# Patient Record
Sex: MALE | Race: WHITE | NOT HISPANIC OR LATINO | Employment: OTHER | URBAN - METROPOLITAN AREA
[De-identification: names, ages, dates, MRNs, and addresses within clinical notes are randomized per-mention and may not be internally consistent; named-entity substitution may affect disease eponyms.]

---

## 2017-08-29 ENCOUNTER — ALLSCRIPTS OFFICE VISIT (OUTPATIENT)
Dept: OTHER | Facility: OTHER | Age: 82
End: 2017-08-29

## 2018-01-13 VITALS
HEIGHT: 67 IN | RESPIRATION RATE: 16 BRPM | WEIGHT: 194 LBS | HEART RATE: 64 BPM | DIASTOLIC BLOOD PRESSURE: 62 MMHG | OXYGEN SATURATION: 98 % | BODY MASS INDEX: 30.45 KG/M2 | TEMPERATURE: 96.6 F | SYSTOLIC BLOOD PRESSURE: 132 MMHG

## 2018-03-01 RX ORDER — AMLODIPINE BESYLATE AND BENAZEPRIL HYDROCHLORIDE 5; 20 MG/1; MG/1
1 CAPSULE ORAL DAILY
COMMUNITY

## 2018-03-01 RX ORDER — SIMVASTATIN 20 MG
1 TABLET ORAL DAILY
COMMUNITY

## 2018-03-06 ENCOUNTER — OFFICE VISIT (OUTPATIENT)
Dept: UROLOGY | Facility: MEDICAL CENTER | Age: 83
End: 2018-03-06
Payer: MEDICARE

## 2018-03-06 VITALS
WEIGHT: 196 LBS | DIASTOLIC BLOOD PRESSURE: 62 MMHG | SYSTOLIC BLOOD PRESSURE: 110 MMHG | HEIGHT: 68 IN | BODY MASS INDEX: 29.7 KG/M2

## 2018-03-06 DIAGNOSIS — N52.02 CORPORO-VENOUS OCCLUSIVE ERECTILE DYSFUNCTION: ICD-10-CM

## 2018-03-06 DIAGNOSIS — N13.8 BPH WITH OBSTRUCTION/LOWER URINARY TRACT SYMPTOMS: Primary | ICD-10-CM

## 2018-03-06 DIAGNOSIS — N40.1 BPH WITH OBSTRUCTION/LOWER URINARY TRACT SYMPTOMS: Primary | ICD-10-CM

## 2018-03-06 PROBLEM — H25.019 CORTICAL AGE-RELATED CATARACT: Status: ACTIVE | Noted: 2017-10-19

## 2018-03-06 LAB
SL AMB  POCT GLUCOSE, UA: 500
SL AMB LEUKOCYTE ESTERASE,UA: NORMAL
SL AMB POCT BILIRUBIN,UA: NORMAL
SL AMB POCT BLOOD,UA: NORMAL
SL AMB POCT CLARITY,UA: CLEAR
SL AMB POCT COLOR,UA: YELLOW
SL AMB POCT KETONES,UA: NORMAL
SL AMB POCT NITRITE,UA: NORMAL
SL AMB POCT PH,UA: 5.5
SL AMB POCT SPECIFIC GRAVITY,UA: 1.01
SL AMB POCT URINE PROTEIN: NORMAL
SL AMB POCT UROBILINOGEN: 0.2

## 2018-03-06 PROCEDURE — 81003 URINALYSIS AUTO W/O SCOPE: CPT | Performed by: UROLOGY

## 2018-03-06 PROCEDURE — 99214 OFFICE O/P EST MOD 30 MIN: CPT | Performed by: UROLOGY

## 2018-03-06 RX ORDER — ACEBUTOLOL HYDROCHLORIDE 400 MG/1
1000 CAPSULE ORAL AS NEEDED
Qty: 12 EACH | Refills: 5 | Status: SHIPPED | OUTPATIENT
Start: 2018-03-06 | End: 2018-09-24 | Stop reason: SDUPTHER

## 2018-03-06 RX ORDER — GLYBURIDE-METFORMIN HYDROCHLORIDE 5; 500 MG/1; MG/1
TABLET ORAL
COMMUNITY
Start: 2018-02-23

## 2018-03-06 RX ORDER — ACEBUTOLOL HYDROCHLORIDE 400 MG/1
CAPSULE ORAL
COMMUNITY
Start: 2018-01-12 | End: 2018-03-06 | Stop reason: SDUPTHER

## 2018-03-06 NOTE — ASSESSMENT & PLAN NOTE
PSA - 1 17 last year  Pros and cons of testing discussed-he wishes to continue psa testing  AUA ss= 3  He is pleased with his voiding pattern  We will continue to follow with watchful waiting

## 2018-03-06 NOTE — ASSESSMENT & PLAN NOTE
MUSE is working adequately, but patient notes Caverject worked better in the past   He will check and see if it is on formulary at [facility]

## 2018-03-06 NOTE — LETTER
March 6, 2018     Luis Keller MD  [address]  [address] [facility] [address] [facility] [address]    Patient: Becca Chavarria   YOB: 1935   Date of Visit: 3/6/2018       Dear Dr Paula Guillory: Thank you for referring Sujatha Barraza to me for evaluation  Below are my notes for this consultation  If you have questions, please do not hesitate to call me  I look forward to following your patient along with you  Sincerely,        Zulma Lambert MD        CC: No Recipients  Zulma Lambert MD  3/6/2018 11:33 AM  Sign at close encounter  Assessment/Plan:    BPH with obstruction/lower urinary tract symptoms  PSA - 1 17 last year  Pros and cons of testing discussed-he wishes to continue psa testing  AUA ss= 3  He is pleased with his voiding pattern  We will continue to follow with watchful waiting  Corporo-venous occlusive erectile dysfunction  MUSE is working adequately, but patient notes Caverject worked better in the past   He will check and see if it is on formulary at [facility]  Diagnoses and all orders for this visit:    BPH with obstruction/lower urinary tract symptoms  -     POCT urine dip auto non-scope  -     PSA Total, Diagnostic; Future    Corporo-venous occlusive erectile dysfunction  -     MUSE 1000 MCG pellet; 1 each (1,000 mcg total) by Transurethral route as needed for erectile dysfunction    Other orders  -     amLODIPine-benazepril (LOTREL) 5-20 MG per capsule; Take 1 capsule by mouth daily  -     metFORMIN (GLUCOPHAGE) 500 mg tablet; Take 1 tablet by mouth Twice daily  -     simvastatin (ZOCOR) 20 mg tablet; Take 1 tablet by mouth daily  -     Discontinue: MUSE 1000 MCG pellet;   -     glyBURIDE-metFORMIN (GLUCOVANCE) 5-500 MG per tablet;           Subjective:      Patient ID: Becca Chavarria is a 80 y o  male  51-year-old male followed for lower tract symptoms secondary to BPH and for erectile dysfunction  He notes he is voiding well    His stream is adequate and he feels he empties his bladder well  He gets up at most twice a night to urinate  He has no urgency or incontinence  He is pleased with his voiding pattern  There is no history of gross hematuria, dysuria or symptoms of infection  He has been using MUSE for rectal dysfunction and he notes that it works adequately  The following portions of the patient's history were reviewed and updated as appropriate: allergies, current medications, past family history, past medical history, past social history, past surgical history and problem list     Review of Systems   Constitutional: Negative for chills, diaphoresis, fatigue and fever  HENT: Negative  Eyes: Negative  Respiratory: Negative  Cardiovascular: Negative  Endocrine: Negative  Musculoskeletal: Negative  Skin: Negative  Allergic/Immunologic: Negative  Neurological: Negative  Hematological: Negative  Psychiatric/Behavioral: Negative  Objective:      /62 (BP Location: Left arm, Patient Position: Sitting)   Ht 5' 7 5" (1 715 m)   Wt 88 9 kg (196 lb)   BMI 30 24 kg/m²           Physical Exam   Constitutional: He is oriented to person, place, and time  He appears well-developed and well-nourished  HENT:   Head: Normocephalic and atraumatic  Eyes: Conjunctivae are normal    Neck: Neck supple  Cardiovascular: Normal rate  Pulmonary/Chest: Effort normal    Abdominal: Soft  Bowel sounds are normal  He exhibits no distension and no mass  There is no tenderness  There is no rebound, no guarding and no CVA tenderness  Hernia confirmed negative in the right inguinal area and confirmed negative in the left inguinal area  Genitourinary: Rectum normal, testes normal and penis normal  Prostate is enlarged  Prostate is not tender  Right testis shows no mass  Left testis shows no mass  No phimosis or hypospadias     Genitourinary Comments: PAT:  Normal tone, no mass, prostate moderately enlarged and palpably benign  No nodularity or tenderness  Musculoskeletal: He exhibits no edema  Neurological: He is alert and oriented to person, place, and time  Skin: Skin is warm and dry  Psychiatric: He has a normal mood and affect  His behavior is normal  Judgment and thought content normal    Nursing note and vitals reviewed

## 2018-03-06 NOTE — PROGRESS NOTES
Assessment/Plan:    BPH with obstruction/lower urinary tract symptoms  PSA - 1 17 last year  Pros and cons of testing discussed-he wishes to continue psa testing  AUA ss= 3  He is pleased with his voiding pattern  We will continue to follow with watchful waiting  Corporo-venous occlusive erectile dysfunction  MUSE is working adequately, but patient notes Caverject worked better in the past   He will check and see if it is on formulary at [address]  Diagnoses and all orders for this visit:    BPH with obstruction/lower urinary tract symptoms  -     POCT urine dip auto non-scope  -     PSA Total, Diagnostic; Future    Corporo-venous occlusive erectile dysfunction  -     MUSE 1000 MCG pellet; 1 each (1,000 mcg total) by Transurethral route as needed for erectile dysfunction    Other orders  -     amLODIPine-benazepril (LOTREL) 5-20 MG per capsule; Take 1 capsule by mouth daily  -     metFORMIN (GLUCOPHAGE) 500 mg tablet; Take 1 tablet by mouth Twice daily  -     simvastatin (ZOCOR) 20 mg tablet; Take 1 tablet by mouth daily  -     Discontinue: MUSE 1000 MCG pellet;   -     glyBURIDE-metFORMIN (GLUCOVANCE) 5-500 MG per tablet;           Subjective:      Patient ID: Marian Milan is a 80 y o  male  49-year-old male followed for lower tract symptoms secondary to BPH and for erectile dysfunction  He notes he is voiding well  His stream is adequate and he feels he empties his bladder well  He gets up at most twice a night to urinate  He has no urgency or incontinence  He is pleased with his voiding pattern  There is no history of gross hematuria, dysuria or symptoms of infection  He has been using MUSE for rectal dysfunction and he notes that it works adequately          The following portions of the patient's history were reviewed and updated as appropriate: allergies, current medications, past family history, past medical history, past social history, past surgical history and problem list     Review of Systems   Constitutional: Negative for chills, diaphoresis, fatigue and fever  HENT: Negative  Eyes: Negative  Respiratory: Negative  Cardiovascular: Negative  Endocrine: Negative  Musculoskeletal: Negative  Skin: Negative  Allergic/Immunologic: Negative  Neurological: Negative  Hematological: Negative  Psychiatric/Behavioral: Negative  Objective:      /62 (BP Location: Left arm, Patient Position: Sitting)   Ht 5' 7 5" (1 715 m)   Wt 88 9 kg (196 lb)   BMI 30 24 kg/m²          Physical Exam   Constitutional: He is oriented to person, place, and time  He appears well-developed and well-nourished  HENT:   Head: Normocephalic and atraumatic  Eyes: Conjunctivae are normal    Neck: Neck supple  Cardiovascular: Normal rate  Pulmonary/Chest: Effort normal    Abdominal: Soft  Bowel sounds are normal  He exhibits no distension and no mass  There is no tenderness  There is no rebound, no guarding and no CVA tenderness  Hernia confirmed negative in the right inguinal area and confirmed negative in the left inguinal area  Genitourinary: Rectum normal, testes normal and penis normal  Prostate is enlarged  Prostate is not tender  Right testis shows no mass  Left testis shows no mass  No phimosis or hypospadias  Genitourinary Comments: PAT:  Normal tone, no mass, prostate moderately enlarged and palpably benign  No nodularity or tenderness  Musculoskeletal: He exhibits no edema  Neurological: He is alert and oriented to person, place, and time  Skin: Skin is warm and dry  Psychiatric: He has a normal mood and affect  His behavior is normal  Judgment and thought content normal    Nursing note and vitals reviewed

## 2018-03-06 NOTE — PROGRESS NOTES
IPSS Questionnaire (AUA-7): Over the past month    1)  How often have you had a sensation of not emptying your bladder completely after you finish urinating? 0 - Not at all   2)  How often have you had to urinate again less than two hours after you finished urinating? 1 - Less than 1 time in 5   3)  How often have you found you stopped and started again several times when you urinated? 0 - Not at all   4) How difficult have you found it to postpone urination? 0 - Not at all   5) How often have you had a weak urinary stream?  0 - Not at all   6) How often have you had to push or strain to begin urination? 0 - Not at all   7) How many times did you most typically get up to urinate from the time you went to bed until the time you got up in the morning?   2 - 2 times   Total Score:  3

## 2018-03-06 NOTE — PATIENT INSTRUCTIONS
Benign Prostatic Hypertrophy   WHAT YOU NEED TO KNOW:   Benign prostatic hypertrophy (BPH) is a condition that causes your prostate gland to grow larger than normal  The prostate gland is the male sex gland that produces a fluid that is part of semen  It is about the size of a walnut and it is located under the bladder  As the prostate grows, it can squeeze the urethra  This can block urine flow and cause urinary problems  DISCHARGE INSTRUCTIONS:   Medicines:   · Alpha blockers: This medicine relaxes the muscles in your prostate and bladder  It may help you urinate more easily  · 5 alpha reductase inhibitors: These medicines block the production of a hormone that causes the prostate to get larger  It may help slow the growth of the prostate or shrink the prostate  · Take your medicine as directed  Contact your healthcare provider if you think your medicine is not helping or if you have side effects  Tell him or her if you are allergic to any medicine  Keep a list of the medicines, vitamins, and herbs you take  Include the amounts, and when and why you take them  Bring the list or the pill bottles to follow-up visits  Carry your medicine list with you in case of an emergency  Follow up with your healthcare provider as directed:  Write down your questions so you remember to ask them during your visits  Manage BPH:   · Do not let your bladder get too full before you empty it  Urinate when you feel the urge  · Limit alcohol  Do not drink large amounts of any liquid at one time  · Decrease the amount of salt you eat  Examples of salty foods are chips, cured meats, and canned soups  Do not use table salt  · Healthcare providers may tell you not to eat spicy foods such as chilli peppers  This may help you find out if spicy food makes your BPH symptoms worse  · You may have sex if you feel well  Contact your healthcare provider if:   · There is a large amount of blood in your urine  · Your signs and symptoms get worse  · You have a fever  · You have questions or concerns about your condition or care  Seek care immediately if:   · You are unable to urinate  · Your bladder feels very full and painful  © 2017 [address] [employer] Information is for End User's use only and may not be sold, redistributed or otherwise used for commercial purposes  All illustrations and images included in CareNotes® are the copyrighted property of A [employer] M [employer]  or Shay Menchaca  The above information is an  only  It is not intended as medical advice for individual conditions or treatments  Talk to your doctor, nurse or pharmacist before following any medical regimen to see if it is safe and effective for you

## 2018-09-24 DIAGNOSIS — N52.02 CORPORO-VENOUS OCCLUSIVE ERECTILE DYSFUNCTION: ICD-10-CM

## 2018-09-24 RX ORDER — ACEBUTOLOL HYDROCHLORIDE 400 MG/1
CAPSULE ORAL
Qty: 18 EACH | Refills: 3 | Status: SHIPPED | OUTPATIENT
Start: 2018-09-24 | End: 2019-03-12 | Stop reason: SDUPTHER

## 2019-03-12 ENCOUNTER — OFFICE VISIT (OUTPATIENT)
Dept: UROLOGY | Facility: MEDICAL CENTER | Age: 84
End: 2019-03-12
Payer: MEDICARE

## 2019-03-12 VITALS
SYSTOLIC BLOOD PRESSURE: 148 MMHG | WEIGHT: 189 LBS | HEART RATE: 66 BPM | BODY MASS INDEX: 30.37 KG/M2 | DIASTOLIC BLOOD PRESSURE: 80 MMHG | HEIGHT: 66 IN

## 2019-03-12 DIAGNOSIS — N40.1 BPH WITH OBSTRUCTION/LOWER URINARY TRACT SYMPTOMS: Primary | ICD-10-CM

## 2019-03-12 DIAGNOSIS — N13.8 BPH WITH OBSTRUCTION/LOWER URINARY TRACT SYMPTOMS: Primary | ICD-10-CM

## 2019-03-12 DIAGNOSIS — N52.02 CORPORO-VENOUS OCCLUSIVE ERECTILE DYSFUNCTION: ICD-10-CM

## 2019-03-12 LAB
SL AMB  POCT GLUCOSE, UA: ABNORMAL
SL AMB LEUKOCYTE ESTERASE,UA: ABNORMAL
SL AMB POCT BILIRUBIN,UA: ABNORMAL
SL AMB POCT BLOOD,UA: ABNORMAL
SL AMB POCT CLARITY,UA: CLEAR
SL AMB POCT COLOR,UA: YELLOW
SL AMB POCT KETONES,UA: ABNORMAL
SL AMB POCT NITRITE,UA: ABNORMAL
SL AMB POCT PH,UA: 5.5
SL AMB POCT SPECIFIC GRAVITY,UA: 1
SL AMB POCT URINE PROTEIN: ABNORMAL
SL AMB POCT UROBILINOGEN: 0.2

## 2019-03-12 PROCEDURE — 99214 OFFICE O/P EST MOD 30 MIN: CPT | Performed by: UROLOGY

## 2019-03-12 PROCEDURE — 81003 URINALYSIS AUTO W/O SCOPE: CPT | Performed by: UROLOGY

## 2019-03-12 RX ORDER — SILDENAFIL 100 MG/1
100 TABLET, FILM COATED ORAL DAILY PRN
Qty: 18 TABLET | Refills: 3 | Status: SHIPPED | OUTPATIENT
Start: 2019-03-12 | End: 2020-06-01 | Stop reason: ALTCHOICE

## 2019-03-12 NOTE — ASSESSMENT & PLAN NOTE
AUA symptom score is 10  He remains satisfied with his voiding pattern  Urinalysis is unremarkable  PSA in April 2018 was 2 83  We will continue to follow his voiding pattern with watchful waiting  We discussed the pros and cons of PSA testing and he wished to continue with PSA testing  He will return in 1 year

## 2019-03-12 NOTE — PATIENT INSTRUCTIONS
Sildenafil (By mouth)   Sildenafil ([id number])  Treats erectile dysfunction  Also treats pulmonary arterial hypertension (high blood pressure in the lungs)  Brand Name(s): Revatio Viagra   There may be other brand names for this medicine  When This Medicine Should Not Be Used: This medicine is not right for everyone  Do not use it if you had an allergic reaction to sildenafil  How to Use This Medicine:   Tablet, Liquid  · Your doctor will tell you how much medicine to use  Do not use more than directed  · For erectile dysfunction: Take this medicine about 1 hour before you have sex  Do not take it more than once a day  Always allow at least 24 hours between doses  · For pulmonary arterial hypertension:   ¨ Take this medicine 3 times a day, 4 to 6 hours apart  ¨ If you miss a dose, take it as soon as you remember  If it is almost time for your next dose, wait until then and take a regular dose  Do not take extra medicine to make up for a missed dose  · Oral liquid: Shake the bottle well for at least 10 seconds  Use the oral syringe provided in the package to measure each dose  Wash the syringe after each use  · Read and follow the patient instructions that come with this medicine  Talk to your doctor or pharmacist if you have any questions  · Store the medicine in a closed container at room temperature, away from heat, moisture, and direct light  · Throw away any unused mixed oral liquid after 60 days  Drugs and Foods to Avoid:   Ask your doctor or pharmacist before using any other medicine, including over-the-counter medicines, vitamins, and herbal products  · Do not use this medicine if you also use riociguat or a nitrate medicine  Do not take other medicines that contain sildenafil or similar medicines, such as tadalafil or vardenafil  · Some medicines can affect how sildenafil works   Tell your doctor if you are using any of the following:   ¨ Amlodipine, atazanavir, bosentan, cimetidine, erythromycin, indinavir, itraconazole, ketoconazole, rifampin, ritonavir, saquinavir  ¨ Medicine for prostate problems or high blood pressure (including alfuzosin, doxazosin, prazosin, silodosin, tamsulosin, terazosin)  Warnings While Using This Medicine:   · Tell your doctor if you are pregnant or breastfeeding, or if you have kidney disease, liver disease, pulmonary veno-occlusive disease, diabetes, bleeding problems, leukemia, multiple myeloma, sickle cell anemia, a stomach ulcer, or eye problems  Tell your doctor if you have angina or chest pain during sex, heart disease, heart rhythm problems, high or low blood pressure, or a history of heart attack or stroke  Also tell your doctor if you smoke  · Tell any doctor who treats you that you take sildenafil  · This medicine may cause the following problems:   ¨ Low blood pressure (especially if taken with other medicines that lower blood pressure)  ¨ Heart problems  ¨ Painful or prolonged erection  ¨ Vision or hearing problems  · Keep all medicine out of the reach of children  Never share your medicine with anyone    Possible Side Effects While Using This Medicine:   Call your doctor right away if you notice any of these side effects:  · Allergic reaction: Itching or hives, swelling in your face or hands, swelling or tingling in your mouth or throat, chest tightness, trouble breathing  · Chest pain, trouble breathing, sudden or severe headache  · Fast, slow, pounding, or uneven heartbeat  · Lightheadedness, fainting  · Painful erection or an erection that lasts longer than 4 hours  · Sudden loss of vision  · Sudden decrease in hearing or hearing loss, ringing in the ears, dizziness  If you notice these less serious side effects, talk with your doctor:   · Headache  · Nosebleeds  · Stuffy or runny nose  · Upset stomach  · Warmth or redness in your face, neck, arms, or upper chest  If you notice other side effects that you think are caused by this medicine, tell your doctor  Call your doctor for medical advice about side effects  You may report side effects to FDA at 0-241-SDA-7052  © 2017 [facility] Information is for End User's use only and may not be sold, redistributed or otherwise used for commercial purposes  The above information is an  only  It is not intended as medical advice for individual conditions or treatments  Talk to your doctor, nurse or pharmacist before following any medical regimen to see if it is safe and effective for you

## 2019-03-12 NOTE — LETTER
March 12, 2019     Dayne Boland MD  [address] [facility] [id number]    Patient: Yovana Nation   YOB: 1935   Date of Visit: 3/12/2019       Dear Dr Rupa Lainez: Thank you for referring Abby Quick to me for evaluation  Below are my notes for this consultation  If you have questions, please do not hesitate to call me  I look forward to following your patient along with you  Sincerely,        Stefania Dutta MD        CC: No Recipients  Stefania Dutta MD  3/12/2019 11:16 AM  Sign at close encounter  Assessment/Plan:    BPH with obstruction/lower urinary tract symptoms  AUA symptom score is 10  He remains satisfied with his voiding pattern  Urinalysis is unremarkable  PSA in April 2018 was 2 83  We will continue to follow his voiding pattern with watchful waiting  We discussed the pros and cons of PSA testing and he wished to continue with PSA testing  He will return in 1 year  Corporo-venous occlusive erectile dysfunction  The patient feels that in uses working adequately  He does wish to try Viagra again  Use and side effects were discussed  Prescriptions were E prescribed to his pharmacy  Diagnoses and all orders for this visit:    BPH with obstruction/lower urinary tract symptoms  -     POCT urine dip auto non-scope  -     PSA Total, Diagnostic; Future    Corporo-venous occlusive erectile dysfunction  -     alprostadil (MUSE) 1000 MCG pellet; 1 each (1,000 mcg total) by Transurethral route as needed for erectile dysfunction use no more than 3 times per week  -     sildenafil (VIAGRA) 100 mg tablet; Take 1 tablet (100 mg total) by mouth daily as needed for erectile dysfunction          Subjective:      Patient ID: Yovana Nation is a 80 y o  male  Benign Prostatic Hypertrophy   This is a chronic problem  The problem is unchanged  Irritative symptoms include nocturia  Irritative symptoms do not include frequency or urgency   Obstructive symptoms include a slower stream  Obstructive symptoms do not include dribbling, incomplete emptying, an intermittent stream, straining or a weak stream  Pertinent negatives include no chills, dysuria, genital pain, hematuria, hesitancy, nausea or vomiting  AUA score is 8-19  His sexual activity is non-contributory to the current illness  The symptoms are aggravated by caffeine  Past treatments include nothing  Erectile Dysfunction   This is a chronic problem  The current episode started more than 1 year ago  The problem is unchanged  The nature of his difficulty is achieving erection and maintaining erection  He reports no anxiety  Irritative symptoms include nocturia  Irritative symptoms do not include frequency or urgency  Obstructive symptoms include a slower stream  Obstructive symptoms do not include dribbling, incomplete emptying, an intermittent stream, straining or a weak stream  Pertinent negatives include no chills, dysuria, genital pain, hematuria or hesitancy  Nothing aggravates the symptoms  Treatments tried: MUSE  The treatment provided moderate relief  He has been using treatment for 2 or more years  He has had no adverse reactions caused by medications  The following portions of the patient's history were reviewed and updated as appropriate: allergies, current medications, past family history, past medical history, past social history, past surgical history and problem list     Review of Systems   Constitutional: Negative  Negative for chills, diaphoresis, fatigue and fever  HENT: Negative  Eyes: Negative  Respiratory: Negative  Cardiovascular: Negative  Gastrointestinal: Negative  Negative for nausea and vomiting  Endocrine: Negative  Genitourinary: Positive for nocturia  Negative for dysuria, frequency, hematuria, hesitancy, incomplete emptying and urgency  See HPI   Musculoskeletal: Negative  Skin: Negative  Allergic/Immunologic: Negative  Neurological: Negative  Hematological: Negative  Psychiatric/Behavioral: Negative  The patient is not nervous/anxious  Objective:      /80 (BP Location: Left arm, Patient Position: Sitting, Cuff Size: Adult)   Pulse 66   Ht 5' 6" (1 676 m)   Wt 85 7 kg (189 lb)   BMI 30 51 kg/m²           Physical Exam   Constitutional: He is oriented to person, place, and time  He appears well-developed and well-nourished  HENT:   Head: Normocephalic and atraumatic  Eyes: Conjunctivae are normal    Neck: Neck supple  Cardiovascular: Normal rate  Pulmonary/Chest: Effort normal    Abdominal: Soft  Bowel sounds are normal  He exhibits no distension and no mass  There is no tenderness  There is no rebound, no guarding and no CVA tenderness  Genitourinary: Rectum normal, testes normal and penis normal  Right testis shows no mass  Left testis shows no mass  No phimosis or hypospadias  Genitourinary Comments: Prostate 2 5 X enlarged  and palpably benign  The prostate is smooth, nontender  and free of induration  Musculoskeletal: He exhibits no edema  Neurological: He is alert and oriented to person, place, and time  Skin: Skin is warm and dry  Psychiatric: He has a normal mood and affect  His behavior is normal  Judgment and thought content normal    Vitals reviewed

## 2019-03-12 NOTE — PROGRESS NOTES
Assessment/Plan:    BPH with obstruction/lower urinary tract symptoms  AUA symptom score is 10  He remains satisfied with his voiding pattern  Urinalysis is unremarkable  PSA in April 2018 was 2 83  We will continue to follow his voiding pattern with watchful waiting  We discussed the pros and cons of PSA testing and he wished to continue with PSA testing  He will return in 1 year  Corporo-venous occlusive erectile dysfunction  The patient feels that in uses working adequately  He does wish to try Viagra again  Use and side effects were discussed  Prescriptions were E prescribed to his pharmacy  Diagnoses and all orders for this visit:    BPH with obstruction/lower urinary tract symptoms  -     POCT urine dip auto non-scope  -     PSA Total, Diagnostic; Future    Corporo-venous occlusive erectile dysfunction  -     alprostadil (MUSE) 1000 MCG pellet; 1 each (1,000 mcg total) by Transurethral route as needed for erectile dysfunction use no more than 3 times per week  -     sildenafil (VIAGRA) 100 mg tablet; Take 1 tablet (100 mg total) by mouth daily as needed for erectile dysfunction          Subjective:      Patient ID: Marian Milan is a 80 y o  male  Benign Prostatic Hypertrophy   This is a chronic problem  The problem is unchanged  Irritative symptoms include nocturia  Irritative symptoms do not include frequency or urgency  Obstructive symptoms include a slower stream  Obstructive symptoms do not include dribbling, incomplete emptying, an intermittent stream, straining or a weak stream  Pertinent negatives include no chills, dysuria, genital pain, hematuria, hesitancy, nausea or vomiting  AUA score is 8-19  His sexual activity is non-contributory to the current illness  The symptoms are aggravated by caffeine  Past treatments include nothing  Erectile Dysfunction   This is a chronic problem  The current episode started more than 1 year ago  The problem is unchanged   The nature of his difficulty is achieving erection and maintaining erection  He reports no anxiety  Irritative symptoms include nocturia  Irritative symptoms do not include frequency or urgency  Obstructive symptoms include a slower stream  Obstructive symptoms do not include dribbling, incomplete emptying, an intermittent stream, straining or a weak stream  Pertinent negatives include no chills, dysuria, genital pain, hematuria or hesitancy  Nothing aggravates the symptoms  Treatments tried: MUSE  The treatment provided moderate relief  He has been using treatment for 2 or more years  He has had no adverse reactions caused by medications  The following portions of the patient's history were reviewed and updated as appropriate: allergies, current medications, past family history, past medical history, past social history, past surgical history and problem list     Review of Systems   Constitutional: Negative  Negative for chills, diaphoresis, fatigue and fever  HENT: Negative  Eyes: Negative  Respiratory: Negative  Cardiovascular: Negative  Gastrointestinal: Negative  Negative for nausea and vomiting  Endocrine: Negative  Genitourinary: Positive for nocturia  Negative for dysuria, frequency, hematuria, hesitancy, incomplete emptying and urgency  See HPI   Musculoskeletal: Negative  Skin: Negative  Allergic/Immunologic: Negative  Neurological: Negative  Hematological: Negative  Psychiatric/Behavioral: Negative  The patient is not nervous/anxious  Objective:      /80 (BP Location: Left arm, Patient Position: Sitting, Cuff Size: Adult)   Pulse 66   Ht 5' 6" (1 676 m)   Wt 85 7 kg (189 lb)   BMI 30 51 kg/m²          Physical Exam   Constitutional: He is oriented to person, place, and time  He appears well-developed and well-nourished  HENT:   Head: Normocephalic and atraumatic  Eyes: Conjunctivae are normal    Neck: Neck supple  Cardiovascular: Normal rate  Pulmonary/Chest: Effort normal    Abdominal: Soft  Bowel sounds are normal  He exhibits no distension and no mass  There is no tenderness  There is no rebound, no guarding and no CVA tenderness  Genitourinary: Rectum normal, testes normal and penis normal  Right testis shows no mass  Left testis shows no mass  No phimosis or hypospadias  Genitourinary Comments: Prostate 2 5 X enlarged  and palpably benign  The prostate is smooth, nontender  and free of induration  Musculoskeletal: He exhibits no edema  Neurological: He is alert and oriented to person, place, and time  Skin: Skin is warm and dry  Psychiatric: He has a normal mood and affect  His behavior is normal  Judgment and thought content normal    Vitals reviewed

## 2019-03-12 NOTE — ASSESSMENT & PLAN NOTE
The patient feels that in uses working adequately  He does wish to try Viagra again  Use and side effects were discussed  Prescriptions were E prescribed to his pharmacy

## 2020-05-28 ENCOUNTER — TELEPHONE (OUTPATIENT)
Dept: UROLOGY | Facility: MEDICAL CENTER | Age: 85
End: 2020-05-28

## 2020-05-28 DIAGNOSIS — N52.02 CORPORO-VENOUS OCCLUSIVE ERECTILE DYSFUNCTION: ICD-10-CM

## 2020-06-01 RX ORDER — SILDENAFIL 100 MG/1
100 TABLET, FILM COATED ORAL DAILY PRN
Qty: 18 TABLET | Refills: 3 | Status: SHIPPED | OUTPATIENT
Start: 2020-06-01 | End: 2021-01-22 | Stop reason: ALTCHOICE

## 2021-01-22 ENCOUNTER — TELEPHONE (OUTPATIENT)
Dept: UROLOGY | Facility: MEDICAL CENTER | Age: 86
End: 2021-01-22

## 2021-01-22 ENCOUNTER — OFFICE VISIT (OUTPATIENT)
Dept: UROLOGY | Facility: MEDICAL CENTER | Age: 86
End: 2021-01-22
Payer: MEDICARE

## 2021-01-22 VITALS
DIASTOLIC BLOOD PRESSURE: 70 MMHG | WEIGHT: 187 LBS | BODY MASS INDEX: 29.35 KG/M2 | SYSTOLIC BLOOD PRESSURE: 150 MMHG | HEIGHT: 67 IN

## 2021-01-22 DIAGNOSIS — N13.8 BPH WITH OBSTRUCTION/LOWER URINARY TRACT SYMPTOMS: ICD-10-CM

## 2021-01-22 DIAGNOSIS — N40.1 BPH WITH OBSTRUCTION/LOWER URINARY TRACT SYMPTOMS: ICD-10-CM

## 2021-01-22 DIAGNOSIS — N52.02 CORPORO-VENOUS OCCLUSIVE ERECTILE DYSFUNCTION: Primary | ICD-10-CM

## 2021-01-22 PROBLEM — Z00.00 MEDICARE ANNUAL WELLNESS VISIT, SUBSEQUENT: Status: ACTIVE | Noted: 2017-07-20

## 2021-01-22 PROBLEM — R91.1 LUNG NODULE: Status: ACTIVE | Noted: 2018-07-19

## 2021-01-22 PROBLEM — S61.208A: Status: ACTIVE | Noted: 2020-12-10

## 2021-01-22 PROCEDURE — 99214 OFFICE O/P EST MOD 30 MIN: CPT | Performed by: UROLOGY

## 2021-01-22 NOTE — ASSESSMENT & PLAN NOTE
AUA symptom score is 1  He is pleased with his voiding pattern  Digital rectal examination is benign in nature  We will continue to follow his voiding pattern watchful waiting  We discussed the pros and cons of PSA testing and elected not to pursue this at age 80  He will return in 1 year

## 2021-01-22 NOTE — PROGRESS NOTES
Assessment/Plan:    BPH with obstruction/lower urinary tract symptoms  AUA symptom score is 1  He is pleased with his voiding pattern  Digital rectal examination is benign in nature  We will continue to follow his voiding pattern watchful waiting  We discussed the pros and cons of PSA testing and elected not to pursue this at age 80  He will return in 1 year  Combined arterial insufficiency and corporo-venous occlusive erectile dysfunction  He had been using Muse with satisfactory result but notes that intracorporeal injection therapy was more effective in the past   Options were discussed and he wished to try prostaglandin intracorporeal injection therapy  A prescription was given  He will return for formal injection therapy teaching  Diagnoses and all orders for this visit:    Corporo-venous occlusive erectile dysfunction  -     PROSTAGLANDIN PGE1 INJECTABLE 20 MCG/ML, STANDARD, IJ SOLN; 1 mL by Intracavernosal route daily as needed (ED)    BPH with obstruction/lower urinary tract symptoms          Subjective:      Patient ID: Maxx Rosenthal is a 80 y o  male  Benign Prostatic Hypertrophy  This is a chronic problem  The problem is unchanged  Irritative symptoms do not include frequency, nocturia (occasional) or urgency  Obstructive symptoms include a slower stream  Obstructive symptoms do not include dribbling, incomplete emptying, an intermittent stream, straining or a weak stream  Pertinent negatives include no chills, dysuria, genital pain, hematuria, hesitancy, nausea or vomiting  AUA score is 0-7  His sexual activity is non-contributory to the current illness  The symptoms are aggravated by caffeine  Past treatments include nothing  Erectile Dysfunction  This is a chronic problem  The current episode started more than 1 year ago  The problem is unchanged  The nature of his difficulty is achieving erection and maintaining erection  He reports no anxiety   Irritative symptoms do not include frequency, nocturia (occasional) or urgency  Obstructive symptoms include a slower stream  Obstructive symptoms do not include dribbling, incomplete emptying, an intermittent stream, straining or a weak stream  Pertinent negatives include no chills, dysuria, genital pain, hematuria or hesitancy  Nothing aggravates the symptoms  Treatments tried: MUSE  The treatment provided mild relief  He has been using treatment for 2 or more years  He has had no adverse reactions caused by medications  Risk factors include BPH, diabetes mellitus and chronic cardiac disease  The following portions of the patient's history were reviewed and updated as appropriate: allergies, current medications, past family history, past medical history, past social history, past surgical history and problem list     Review of Systems   Constitutional: Negative  Negative for chills, diaphoresis, fatigue and fever  HENT: Negative  Eyes: Negative  Respiratory: Negative  Cardiovascular: Negative  Gastrointestinal: Negative  Negative for nausea and vomiting  Endocrine: Negative  Genitourinary: Negative for dysuria, frequency, hematuria, hesitancy, incomplete emptying, nocturia (occasional) and urgency  See HPI   Musculoskeletal: Negative  Skin: Negative  Allergic/Immunologic: Negative  Neurological: Negative  Hematological: Negative  Psychiatric/Behavioral: Negative  The patient is not nervous/anxious  AUA SYMPTOM SCORE      Most Recent Value   AUA SYMPTOM SCORE   How often have you had a sensation of not emptying your bladder completely after you finished urinating? 1   How often have you had to urinate again less than two hours after you finished urinating? 0   How often have you found you stopped and started again several times when you urinate? 1   How often have you found it difficult to postpone urination?   0   How often have you had a weak urinary stream?  0   How often have you had to push or strain to begin urination? 0   How many times did you most typically get up to urinate from the time you went to bed at night until the time you got up in the morning? 1   Quality of Life: If you were to spend the rest of your life with your urinary condition just the way it is now, how would you feel about that?  1   AUA SYMPTOM SCORE  3        Objective:      /70 (BP Location: Left arm, Patient Position: Sitting, Cuff Size: Adult)   Ht 5' 7" (1 702 m)   Wt 84 8 kg (187 lb)   BMI 29 29 kg/m²          Physical Exam  Vitals signs reviewed  Constitutional:       General: He is not in acute distress  Appearance: Normal appearance  He is well-developed  He is not ill-appearing, toxic-appearing or diaphoretic  HENT:      Head: Normocephalic and atraumatic  Eyes:      General: No scleral icterus  Conjunctiva/sclera: Conjunctivae normal    Neck:      Musculoskeletal: Normal range of motion and neck supple  Cardiovascular:      Rate and Rhythm: Normal rate  Pulmonary:      Effort: Pulmonary effort is normal    Abdominal:      General: Bowel sounds are normal  There is no distension  Palpations: Abdomen is soft  There is no mass  Tenderness: There is no abdominal tenderness  There is no right CVA tenderness, left CVA tenderness, guarding or rebound  Hernia: No hernia is present  Genitourinary:     Penis: Normal  No phimosis or hypospadias  Scrotum/Testes: Normal          Right: Mass not present  Left: Mass not present  Rectum: Normal       Comments: Prostate 2 5 X enlarged  and palpably benign  The prostate is smooth, nontender  and free of induration  Skin:     General: Skin is warm and dry  Neurological:      General: No focal deficit present  Mental Status: He is alert and oriented to person, place, and time  Psychiatric:         Mood and Affect: Mood normal          Behavior: Behavior normal          Thought Content:  Thought content normal          Judgment: Judgment normal

## 2021-01-22 NOTE — PATIENT INSTRUCTIONS
Erectile Dysfunction   AMBULATORY CARE:   Erectile dysfunction (ED) , or impotence, is when you cannot get or keep an erection for sexual activity  Call your doctor if:   · You have chest pain, dizziness, or nausea after you take ED medicines or during or after sex  · You have an erection for more than 4 hours after you take your ED medicine  · You see blood in your urine  · You have changes in your vision, headaches, or back pain after you take ED medicine  · You have a painful erection  · You have questions or concerns about your condition or care  Treatment  depends on the cause of your ED  You may need any of the following:  · ED medicines  help you have an erection  These medicines are taken before you have sex  Follow instructions on how to use these medicines  You may have a life-threatening reaction if you mix ED medicines with medicines that contain nitrates  Medicines with nitrates include nitroglycerin and other heart medicines  · Testosterone  may be given to increase the levels in your blood and improve your ED  You may need to use a skin cream or wear a patch  Testosterone is also given as an injection  · Penis injections  may be done to help improve your blood flow  · A vacuum device  is a tube that is placed over the penis  A hand pump is connected to the tube and acts as a vacuum  This may help increase blood flow to the penis  · Therapy  may be needed to treat emotional or relationship problems that may be causing your ED  · Surgery  may be recommended if other treatments do not work  Surgery includes a penile implant or prosthesis  Surgery may also be done to improve blood flow  Ask for more information about surgeries for ED  Decrease your risk for ED:   · Do not smoke  Smoking can increase your risk for ED  Nicotine and other chemicals in cigarettes and cigars can also cause lung damage   Ask your healthcare provider for information if you currently smoke and need help to quit  E-cigarettes or smokeless tobacco still contain nicotine  · Control your blood sugar levels if you have diabetes  Over time, high blood sugar levels can increase your risk for ED  · Limit alcohol  Men should limit alcohol to 2 drinks a day  A drink of alcohol is 12 ounces of beer, 5 ounces of wine, or 1½ ounces of liquor  · Manage your medical conditions  Eat a variety of healthy foods and stay physically active  Take your medicines as directed  They can help control conditions that may cause ED  · Manage stress  Learn ways to relax, such as deep breathing, meditation, and listening to music  · Do not use illegal drugs  They increase your risk for ED  Follow up with your doctor as directed:  Write down your questions so you remember to ask them during your visits  © Copyright [address] Information is for End User's use only and may not be sold, redistributed or otherwise used for commercial purposes  All illustrations and images included in CareNotes® are the copyrighted property of A D A M , Inc  or [address] [place], [place] [address]   The above information is an  only  It is not intended as medical advice for individual conditions or treatments  Talk to your doctor, nurse or pharmacist before following any medical regimen to see if it is safe and effective for you

## 2021-01-22 NOTE — ASSESSMENT & PLAN NOTE
He had been using Muse with satisfactory result but notes that intracorporeal injection therapy was more effective in the past   Options were discussed and he wished to try prostaglandin intracorporeal injection therapy  A prescription was given  He will return for formal injection therapy teaching

## 2021-01-22 NOTE — TELEPHONE ENCOUNTER
Pt questioning when he should get his prescription filled  He is scheduled with [facility] for injection teaching 4/9/21  Per Dr Ele Ponce, patient should wait until a few weeks before appt to get prescription filled        Left message on voice mail for patient regarding instructions

## 2021-04-09 ENCOUNTER — OFFICE VISIT (OUTPATIENT)
Dept: UROLOGY | Facility: MEDICAL CENTER | Age: 86
End: 2021-04-09
Payer: MEDICARE

## 2021-04-09 VITALS
DIASTOLIC BLOOD PRESSURE: 84 MMHG | WEIGHT: 187 LBS | SYSTOLIC BLOOD PRESSURE: 122 MMHG | HEART RATE: 88 BPM | BODY MASS INDEX: 29.35 KG/M2 | HEIGHT: 67 IN

## 2021-04-09 DIAGNOSIS — N52.9 ERECTILE DYSFUNCTION, UNSPECIFIED ERECTILE DYSFUNCTION TYPE: Primary | ICD-10-CM

## 2021-04-09 PROCEDURE — 99214 OFFICE O/P EST MOD 30 MIN: CPT | Performed by: NURSE PRACTITIONER

## 2021-04-09 RX ORDER — SIMVASTATIN 40 MG
TABLET ORAL
COMMUNITY
Start: 2021-01-26

## 2021-04-09 NOTE — PROGRESS NOTES
Trimix teaching  4/9/2021      Assessment and Plan    80 y o  male managed by Dr Vicki Ngo    1  Erectile Dysfunction    Patient was provided verbal and physical demonstration of Trimix use  He was instructed on storage, disposal, and titration of the medication  He was educated on hospital precautions for priapism  He was able to demonstrate understanding of injection and injected 0 1cc of the medication today in the office  He tolerated the procedure well  Depending on response, will titrate as necessary and follow up in 1 year  All questions and concerns have been addressed and answered  Chief Complaint   Patient presents with    Erectile Dysfunction         History of Present Illness  Elvira Castillo is a 80 y o  male here for trimix injection teaching  He has a history of erectile dysfuntion and has failed PDE5 inhibitors including MUSE  Patient with a longstanding history of erectile dysfunction and has had successful use with [place]  At his last office evaluation felt it would be more satisfactory to use intracorporeal injections  He presents to the office today for [facility] [employer] teaching  Past medical history includes BPH without lower urinary tract symptoms  He denies changes to his general health and urinary symptoms since his prior office evaluation in the office  Review of Systems   Constitutional: Negative for chills and fever  Respiratory: Negative for cough and shortness of breath  Cardiovascular: Negative for chest pain  Gastrointestinal: Negative for abdominal distention, abdominal pain, blood in stool, nausea and vomiting  Genitourinary: Negative for difficulty urinating, dysuria, enuresis, flank pain, frequency, hematuria and urgency  Skin: Negative for rash           Past Medical History  Past Medical History:   Diagnosis Date    BPH with obstruction/lower urinary tract symptoms     Combined arterial insufficiency and corporo-venous occlusive erectile dysfunction        Past Social History  History reviewed  No pertinent surgical history  Past Family History  Family History   Problem Relation Age of Onset    Hypertension Family        Past Social history  Social History     Socioeconomic History    Marital status:       Spouse name: Not on file    Number of children: Not on file    Years of education: Not on file    Highest education level: Not on file   Occupational History    Not on file   Social Needs    Financial resource strain: Not on file    Food insecurity     Worry: Not on file     Inability: Not on file    Transportation needs     Medical: Not on file     Non-medical: Not on file   Tobacco Use    Smoking status: Former Smoker    Smokeless tobacco: Never Used   Substance and Sexual Activity    Alcohol use: No    Drug use: No    Sexual activity: Not on file   Lifestyle    Physical activity     Days per week: Not on file     Minutes per session: Not on file    Stress: Not on file   Relationships    Social connections     Talks on phone: Not on file     Gets together: Not on file     Attends Alevism service: Not on file     Active member of club or organization: Not on file     Attends meetings of clubs or organizations: Not on file     Relationship status: Not on file    Intimate partner violence     Fear of current or ex partner: Not on file     Emotionally abused: Not on file     Physically abused: Not on file     Forced sexual activity: Not on file   Other Topics Concern    Not on file   Social History Narrative    Not on file       Current Medications  Current Outpatient Medications   Medication Sig Dispense Refill    amLODIPine-benazepril (LOTREL) 5-20 MG per capsule Take 1 capsule by mouth daily      glyBURIDE-metFORMIN (GLUCOVANCE) 5-500 MG per tablet       PROSTAGLANDIN PGE1 INJECTABLE 20 MCG/ML, STANDARD, IJ SOLN 1 mL by Intracavernosal route daily as needed (ED) 5 mL 3    simvastatin (ZOCOR) 40 mg tablet       metFORMIN (GLUCOPHAGE) 500 mg tablet Take 1 tablet by mouth Twice daily      simvastatin (ZOCOR) 20 mg tablet Take 1 tablet by mouth daily       No current facility-administered medications for this visit  Allergies  No Known Allergies      Past Medical History, Social History, Family History, medications and allergies were reviewed  Vitals  Vitals:    04/09/21 1351   BP: 122/84   Pulse: 88   Weight: 84 8 kg (187 lb)   Height: 5' 7" (1 702 m)         Physical Exam    Constitutional   General appearance: Patient is seated and in no acute distress, well appearing and well nourished  Head and Face   Head and face: Normal     Eyes   Conjunctiva and lids: No erythema, swelling or discharge  Ears, Nose, Mouth, and Throat   Hearing: Normal     Pulmonary   Respiratory effort: No increased work of breathing or signs of respiratory distress  Cardiovascular   Examination of extremities for edema and/or varicosities: Normal     Abdomen   Abdomen: Non-tender, no masses  Genitourinary  Penis uncircumcised, phallus normal, meatus patent  Testicles descended into scrotum bilaterally without masses nor tenderness  Musculoskeletal   Gait and station:     Skin   Skin and subcutaneous tissue: Warm, dry, and intact  No visible lesions or rashes  Psychiatric   Judgment and insight: Normal  Recent and remote memory:  Normal  Mood and affect: Normal        Procedure: intracavernosal injection  Indication: Erectile Rehabilitation  secondary to diabetes  Discussed:  Proper technique and instruction for intracavernosal injection were explained to the patient  risks of injection including but not limited to pain, bleeding, infection, fibrosis, and priapism (including the potential complications of priapism) were discussed  Procedure: The skin overlying the injection site was then prepped with Alcohol  0 1 ml of was injected into the corpora cavernosum  Patient Status:  the patient was closely monitored for 10 minutes and reassessed  He tolerated the procedure well  Response to intracavernosal injection was good   Complications:  No complications noted  Instructions:  the patient was counseled about priapism and instructed to return to the clinic or the emergency room if his erection lasted up to 4 hours  the patient expressed understanding of the injection technique and felt able to perform self-injection         RENEE Grossman

## 2022-08-04 ENCOUNTER — TELEPHONE (OUTPATIENT)
Dept: OTHER | Facility: OTHER | Age: 87
End: 2022-08-04

## 2022-08-04 NOTE — TELEPHONE ENCOUNTER
Patient is requesting refill on Prostaglandin medication  Patient has not  been seen by Dr Isaac Zavala since 1/22/2021

## 2022-08-05 NOTE — TELEPHONE ENCOUNTER
Call placed to patient  He did not answer  LMOM informing patient he needs to contact the office to schedule an office appointment for evaluation at this time before medication can be refilled  Office number was provided for the patient to call back and schedule

## 2022-09-08 ENCOUNTER — TELEPHONE (OUTPATIENT)
Dept: OTHER | Facility: OTHER | Age: 87
End: 2022-09-08

## 2022-09-09 ENCOUNTER — OFFICE VISIT (OUTPATIENT)
Dept: UROLOGY | Facility: MEDICAL CENTER | Age: 87
End: 2022-09-09
Payer: MEDICARE

## 2022-09-09 VITALS
DIASTOLIC BLOOD PRESSURE: 72 MMHG | SYSTOLIC BLOOD PRESSURE: 162 MMHG | BODY MASS INDEX: 29.03 KG/M2 | HEART RATE: 67 BPM | HEIGHT: 67 IN | OXYGEN SATURATION: 97 % | WEIGHT: 185 LBS

## 2022-09-09 DIAGNOSIS — N52.02 CORPORO-VENOUS OCCLUSIVE ERECTILE DYSFUNCTION: ICD-10-CM

## 2022-09-09 DIAGNOSIS — E11.9 TYPE 2 DIABETES MELLITUS WITHOUT COMPLICATION, UNSPECIFIED WHETHER LONG TERM INSULIN USE (HCC): ICD-10-CM

## 2022-09-09 DIAGNOSIS — N13.8 BPH WITH OBSTRUCTION/LOWER URINARY TRACT SYMPTOMS: Primary | ICD-10-CM

## 2022-09-09 DIAGNOSIS — N40.1 BPH WITH OBSTRUCTION/LOWER URINARY TRACT SYMPTOMS: Primary | ICD-10-CM

## 2022-09-09 PROCEDURE — 99214 OFFICE O/P EST MOD 30 MIN: CPT | Performed by: UROLOGY

## 2022-09-09 NOTE — ASSESSMENT & PLAN NOTE
AUA symptom score is 5  He is pleased with his voiding pattern  Digital rectal examination is benign in nature  We will continue to follow his voiding pattern watchful waiting  He will return in 1 year

## 2022-09-09 NOTE — ASSESSMENT & PLAN NOTE
The patient is tired of using intracorporeal injection therapy  He would like to try [place] again  A prescription was sent to his pharmacy  He will return in 1 year

## 2022-09-09 NOTE — PROGRESS NOTES
Assessment/Plan:    BPH with obstruction/lower urinary tract symptoms  AUA symptom score is 5  He is pleased with his voiding pattern  Digital rectal examination is benign in nature  We will continue to follow his voiding pattern watchful waiting  He will return in 1 year  Combined arterial insufficiency and corporo-venous occlusive erectile dysfunction  The patient is tired of using intracorporeal injection therapy  He would like to try [place] again  A prescription was sent to his pharmacy  He will return in 1 year  Diagnoses and all orders for this visit:    BPH with obstruction/lower urinary tract symptoms    Corporo-venous occlusive erectile dysfunction  -     alprostadil (MUSE) 1000 MCG pellet; 1 each (1,000 mcg total) by Transurethral route as needed for erectile dysfunction use no more than 3 times per week  3 boxes of 6 pllt    Type 2 diabetes mellitus without complication, unspecified whether long term insulin use (HCC)          Subjective:      Patient ID: Blade Soto is a 80 y o  male  Benign Prostatic Hypertrophy  This is a chronic problem  The current episode started more than 1 year ago  The problem is unchanged  Irritative symptoms do not include frequency, nocturia or urgency  Obstructive symptoms include a slower stream  Obstructive symptoms do not include dribbling, incomplete emptying, an intermittent stream, straining or a weak stream  Pertinent negatives include no chills, dysuria or hematuria  AUA score is 0-7  His sexual activity is non-contributory to the current illness  Nothing aggravates the symptoms  Past treatments include nothing  Erectile Dysfunction  This is a chronic problem  The current episode started more than 1 year ago  The problem is unchanged  The nature of his difficulty is achieving erection  He reports no decreased libido  Irritative symptoms do not include frequency, nocturia or urgency   Obstructive symptoms include a slower stream  Obstructive symptoms do not include dribbling, incomplete emptying, an intermittent stream, straining or a weak stream  Pertinent negatives include no chills, dysuria or hematuria  Nothing aggravates the symptoms  Past treatments include injection treatment  He has had no adverse reactions caused by medications  He does not want to do injection therapy any longer  Oral meds have not been effective  He has used Muse before and it worked - he  would like to try MUSE again  The following portions of the patient's history were reviewed and updated as appropriate: allergies, current medications, past family history, past medical history, past social history, past surgical history and problem list     Review of Systems   Constitutional: Negative for chills, diaphoresis, fatigue and fever  HENT: Positive for hearing loss  Eyes: Negative  Respiratory: Negative  Cardiovascular: Negative  Gastrointestinal: Negative  Endocrine: Negative  Genitourinary: Negative for decreased libido, dysuria, frequency, hematuria, incomplete emptying, nocturia and urgency  See HPI   Musculoskeletal: Negative  Skin: Negative  Allergic/Immunologic: Negative  Neurological: Negative  Hematological: Negative  Psychiatric/Behavioral: Negative  AUA SYMPTOM SCORE    Flowsheet Row Most Recent Value   AUA SYMPTOM SCORE    How often have you had a sensation of not emptying your bladder completely after you finished urinating? 1   How often have you had to urinate again less than two hours after you finished urinating? 2   How often have you found you stopped and started again several times when you urinate? 0   How often have you found it difficult to postpone urination? 0   How often have you had a weak urinary stream? 0   How often have you had to push or strain to begin urination?  0   How many times did you most typically get up to urinate from the time you went to bed at night until the time you got up in the morning? 2   Quality of Life: If you were to spend the rest of your life with your urinary condition just the way it is now, how would you feel about that? 1   AUA SYMPTOM SCORE 5      Objective:      /72   Pulse 67   Ht 5' 7" (1 702 m)   Wt 83 9 kg (185 lb)   SpO2 97%   BMI 28 98 kg/m²          Physical Exam  Vitals reviewed  Constitutional:       General: He is not in acute distress  Appearance: Normal appearance  He is well-developed  He is not ill-appearing, toxic-appearing or diaphoretic  HENT:      Head: Normocephalic and atraumatic  Eyes:      General: No scleral icterus  Conjunctiva/sclera: Conjunctivae normal    Cardiovascular:      Rate and Rhythm: Normal rate  Pulmonary:      Effort: Pulmonary effort is normal    Abdominal:      General: Bowel sounds are normal  There is no distension  Palpations: Abdomen is soft  There is no mass  Tenderness: There is no abdominal tenderness  There is no right CVA tenderness, left CVA tenderness, guarding or rebound  Hernia: No hernia is present  Genitourinary:     Penis: Normal  No phimosis or hypospadias  Testes: Normal          Right: Mass not present  Left: Mass not present  Rectum: Normal       Comments: Prostate moderately enlarged and palpably benign  Musculoskeletal:         General: Normal range of motion  Cervical back: Neck supple  Skin:     General: Skin is warm and dry  Neurological:      Mental Status: He is alert and oriented to person, place, and time  Psychiatric:         Mood and Affect: Mood normal          Behavior: Behavior normal          Thought Content:  Thought content normal          Judgment: Judgment normal

## 2022-10-12 PROBLEM — Z00.00 MEDICARE ANNUAL WELLNESS VISIT, SUBSEQUENT: Status: RESOLVED | Noted: 2017-07-20 | Resolved: 2022-10-12

## 2022-10-21 ENCOUNTER — TELEPHONE (OUTPATIENT)
Dept: UROLOGY | Facility: AMBULATORY SURGERY CENTER | Age: 87
End: 2022-10-21

## 2022-10-21 DIAGNOSIS — N52.02 CORPORO-VENOUS OCCLUSIVE ERECTILE DYSFUNCTION: ICD-10-CM

## 2022-10-21 NOTE — TELEPHONE ENCOUNTER
Patient left voicemail requesting a call back  Attempted to contact patient,  No answer   Left voicemail to call office back

## 2022-10-21 NOTE — TELEPHONE ENCOUNTER
Patient needs to have a new prescription written stated [facility] is out     Medication Refill Request     Name   alprostadil (MUSE)  Dose/Frequency 1000 MCG pellet    Quantity 18 each   Verified pharmacy   [x]  Verified ordering Provider   [x]  Does patient have enough for the next 3 days?  Yes [] No [x]      [id number] - [id number], [address]   Phone:  [phone]

## 2022-10-25 NOTE — TELEPHONE ENCOUNTER
Patient calling to confirm if prescription request for [place] was sent to Dr Hernandes Mosotho  Informed patient the refill request was received  Patient requesting a call back at [phone] once the prescription is filled

## 2022-10-27 ENCOUNTER — TELEPHONE (OUTPATIENT)
Dept: UROLOGY | Facility: MEDICAL CENTER | Age: 87
End: 2022-10-27

## 2022-10-27 ENCOUNTER — TELEPHONE (OUTPATIENT)
Dept: UROLOGY | Facility: AMBULATORY SURGERY CENTER | Age: 87
End: 2022-10-27

## 2022-10-27 DIAGNOSIS — N52.02 CORPORO-VENOUS OCCLUSIVE ERECTILE DYSFUNCTION: ICD-10-CM

## 2022-10-27 NOTE — TELEPHONE ENCOUNTER
Patient requesting refill for [place] sent to [facility] in [facility]

## 2022-11-29 DIAGNOSIS — N52.02 CORPORO-VENOUS OCCLUSIVE ERECTILE DYSFUNCTION: ICD-10-CM

## 2022-11-29 NOTE — TELEPHONE ENCOUNTER
Per patient, he has been out of this medication for 2 months  medication was sent to wrong pharmacy 2 times  Medication Refill Request     Name Muse  Dose/Frequency 1000 MCG 1 each (1,000 mcg total) by Transurethral route as needed for erectile dysfunction use no more than 3 times per week  Quantity 18  Verified pharmacy   [x]  Verified ordering Provider   [x]  Does patient have enough for the next 3 days?  Yes [] No

## 2023-10-20 ENCOUNTER — HOSPITAL ENCOUNTER (INPATIENT)
Facility: HOSPITAL | Age: 88
LOS: 1 days | Discharge: HOME HEALTH | DRG: 084 | End: 2023-10-21
Attending: SURGERY | Admitting: SURGERY
Payer: MEDICARE

## 2023-10-20 ENCOUNTER — APPOINTMENT (EMERGENCY)
Dept: CT IMAGING | Facility: HOSPITAL | Age: 88
DRG: 084 | End: 2023-10-20
Payer: MEDICARE

## 2023-10-20 ENCOUNTER — HOSPITAL ENCOUNTER (EMERGENCY)
Facility: HOSPITAL | Age: 88
DRG: 084 | End: 2023-10-20
Attending: EMERGENCY MEDICINE
Payer: MEDICARE

## 2023-10-20 VITALS
DIASTOLIC BLOOD PRESSURE: 69 MMHG | RESPIRATION RATE: 22 BRPM | TEMPERATURE: 98.5 F | HEIGHT: 67 IN | SYSTOLIC BLOOD PRESSURE: 141 MMHG | HEART RATE: 75 BPM | OXYGEN SATURATION: 94 % | BODY MASS INDEX: 29.03 KG/M2 | WEIGHT: 185 LBS

## 2023-10-20 DIAGNOSIS — I10 HYPERTENSION: ICD-10-CM

## 2023-10-20 DIAGNOSIS — S06.4XAA EPIDURAL HEMATOMA (HCC): Primary | ICD-10-CM
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
## 2023-10-20 DIAGNOSIS — S06.4X0A INTRACRANIAL EPIDURAL HEMATOMA (HCC): Primary | ICD-10-CM

## 2023-10-20 DIAGNOSIS — S00.01XA ABRASION OF SCALP, INITIAL ENCOUNTER: ICD-10-CM

## 2023-10-20 LAB
ALBUMIN SERPL BCP-MCNC: 4.6 G/DL (ref 3.5–5)
ALP SERPL-CCNC: 65 U/L (ref 34–104)
ALT SERPL W P-5'-P-CCNC: 28 U/L (ref 7–52)
ANION GAP SERPL CALCULATED.3IONS-SCNC: 9 MMOL/L
APTT PPP: 30 SECONDS (ref 23–37)
AST SERPL W P-5'-P-CCNC: 19 U/L (ref 13–39)
BASOPHILS # BLD MANUAL: 0 THOUSAND/UL (ref 0–0.1)
BASOPHILS NFR MAR MANUAL: 0 % (ref 0–1)
BILIRUB SERPL-MCNC: 0.67 MG/DL (ref 0.2–1)
BUN SERPL-MCNC: 25 MG/DL (ref 5–25)
CALCIUM SERPL-MCNC: 9.9 MG/DL (ref 8.4–10.2)
CHLORIDE SERPL-SCNC: 100 MMOL/L (ref 96–108)
CO2 SERPL-SCNC: 27 MMOL/L (ref 21–32)
CREAT SERPL-MCNC: 0.87 MG/DL (ref 0.6–1.3)
EOSINOPHIL # BLD MANUAL: 0 THOUSAND/UL (ref 0–0.4)
EOSINOPHIL NFR BLD MANUAL: 0 % (ref 0–6)
ERYTHROCYTE [DISTWIDTH] IN BLOOD BY AUTOMATED COUNT: 12.8 % (ref 11.6–15.1)
GFR SERPL CREATININE-BSD FRML MDRD: 77 ML/MIN/1.73SQ M
GLUCOSE SERPL-MCNC: 187 MG/DL (ref 65–140)
HCT VFR BLD AUTO: 38.8 % (ref 36.5–49.3)
HGB BLD-MCNC: 13.3 G/DL (ref 12–17)
INR PPP: 0.97 (ref 0.84–1.19)
LYMPHOCYTES # BLD AUTO: 1.09 THOUSAND/UL (ref 0.6–4.47)
LYMPHOCYTES # BLD AUTO: 7 % (ref 14–44)
MCH RBC QN AUTO: 30.4 PG (ref 26.8–34.3)
MCHC RBC AUTO-ENTMCNC: 34.3 G/DL (ref 31.4–37.4)
MCV RBC AUTO: 89 FL (ref 82–98)
MONOCYTES # BLD AUTO: 0.94 THOUSAND/UL (ref 0–1.22)
MONOCYTES NFR BLD: 6 % (ref 4–12)
NEUTROPHILS # BLD MANUAL: 13.61 THOUSAND/UL (ref 1.85–7.62)
NEUTS BAND NFR BLD MANUAL: 1 % (ref 0–8)
NEUTS SEG NFR BLD AUTO: 86 % (ref 43–75)
PLATELET # BLD AUTO: 273 THOUSANDS/UL (ref 149–390)
PLATELET BLD QL SMEAR: ADEQUATE
PMV BLD AUTO: 9.6 FL (ref 8.9–12.7)
POTASSIUM SERPL-SCNC: 4.1 MMOL/L (ref 3.5–5.3)
PROT SERPL-MCNC: 7.3 G/DL (ref 6.4–8.4)
PROTHROMBIN TIME: 12.9 SECONDS (ref 11.6–14.5)
RBC # BLD AUTO: 4.38 MILLION/UL (ref 3.88–5.62)
RBC MORPH BLD: NORMAL
SODIUM SERPL-SCNC: 136 MMOL/L (ref 135–147)
WBC # BLD AUTO: 15.64 THOUSAND/UL (ref 4.31–10.16)

## 2023-10-20 PROCEDURE — 1124F ACP DISCUSS-NO DSCNMKR DOCD: CPT | Performed by: SURGERY

## 2023-10-20 PROCEDURE — 99291 CRITICAL CARE FIRST HOUR: CPT | Performed by: EMERGENCY MEDICINE

## 2023-10-20 PROCEDURE — 80053 COMPREHEN METABOLIC PANEL: CPT | Performed by: EMERGENCY MEDICINE

## 2023-10-20 PROCEDURE — 36415 COLL VENOUS BLD VENIPUNCTURE: CPT | Performed by: EMERGENCY MEDICINE

## 2023-10-20 PROCEDURE — 85610 PROTHROMBIN TIME: CPT | Performed by: EMERGENCY MEDICINE

## 2023-10-20 PROCEDURE — 99223 1ST HOSP IP/OBS HIGH 75: CPT | Performed by: SURGERY

## 2023-10-20 PROCEDURE — 99284 EMERGENCY DEPT VISIT MOD MDM: CPT

## 2023-10-20 PROCEDURE — G1004 CDSM NDSC: HCPCS

## 2023-10-20 PROCEDURE — 85007 BL SMEAR W/DIFF WBC COUNT: CPT | Performed by: EMERGENCY MEDICINE

## 2023-10-20 PROCEDURE — 72125 CT NECK SPINE W/O DYE: CPT

## 2023-10-20 PROCEDURE — 70450 CT HEAD/BRAIN W/O DYE: CPT

## 2023-10-20 PROCEDURE — 85027 COMPLETE CBC AUTOMATED: CPT | Performed by: EMERGENCY MEDICINE

## 2023-10-20 PROCEDURE — 85730 THROMBOPLASTIN TIME PARTIAL: CPT | Performed by: EMERGENCY MEDICINE

## 2023-10-20 RX ORDER — INSULIN LISPRO 100 [IU]/ML
1-6 INJECTION, SOLUTION INTRAVENOUS; SUBCUTANEOUS
Status: DISCONTINUED | OUTPATIENT
Start: 2023-10-21 | End: 2023-10-21 | Stop reason: HOSPADM

## 2023-10-20 RX ORDER — ACETAMINOPHEN 325 MG/1
650 TABLET ORAL EVERY 6 HOURS PRN
Status: DISCONTINUED | OUTPATIENT
Start: 2023-10-20 | End: 2023-10-21 | Stop reason: HOSPADM

## 2023-10-20 RX ORDER — FENTANYL CITRATE 50 UG/ML
50 INJECTION, SOLUTION INTRAMUSCULAR; INTRAVENOUS ONCE
Status: DISCONTINUED | OUTPATIENT
Start: 2023-10-20 | End: 2023-10-20 | Stop reason: HOSPADM

## 2023-10-20 RX ORDER — PRAVASTATIN SODIUM 80 MG/1
80 TABLET ORAL
Status: DISCONTINUED | OUTPATIENT
Start: 2023-10-21 | End: 2023-10-21 | Stop reason: HOSPADM

## 2023-10-20 RX ORDER — LEVETIRACETAM 500 MG/1
500 TABLET ORAL 2 TIMES DAILY
Status: DISCONTINUED | OUTPATIENT
Start: 2023-10-20 | End: 2023-10-21 | Stop reason: HOSPADM

## 2023-10-20 RX ORDER — AMLODIPINE BESYLATE 5 MG/1
5 TABLET ORAL DAILY
Status: DISCONTINUED | OUTPATIENT
Start: 2023-10-21 | End: 2023-10-21 | Stop reason: HOSPADM

## 2023-10-20 RX ORDER — ONDANSETRON 2 MG/ML
4 INJECTION INTRAMUSCULAR; INTRAVENOUS EVERY 6 HOURS PRN
Status: DISCONTINUED | OUTPATIENT
Start: 2023-10-20 | End: 2023-10-21 | Stop reason: HOSPADM

## 2023-10-20 RX ORDER — CHLORTHALIDONE 25 MG/1
25 TABLET ORAL DAILY
COMMUNITY
Start: 2023-05-05 | End: 2024-05-04

## 2023-10-20 NOTE — ED NOTES
Patient ambulated to the bathroom without difficulty     [facility]  10/20/23 7302

## 2023-10-20 NOTE — EMTALA/ACUTE CARE TRANSFER
[address] [place], [place] [id number]  Dept: [phone]      EMTALA TRANSFER CONSENT    NAME Joesph RAIN 1935                              MRN [id number]    I have been informed of my rights regarding examination, treatment, and transfer   by Dr. Analy Srinivasan MD    Benefits: Specialized equipment and/or services available at the receiving facility (Include comment)________________________ (trauma)    Risks: Potential for delay in receiving treatment, Potential deterioration of medical condition, Loss of IV, Increased discomfort during transfer, Possible worsening of condition or death during transfer      Consent for Transfer:  I acknowledge that my medical condition has been evaluated and explained to me by the emergency department physician or other qualified medical person and/or my attending physician, who has recommended that I be transferred to the service of  Accepting Physician: Kelly Calixto at [facility] [address] Name, [address] [place] [address] Street : [address] [place] [address]. The above potential benefits of such transfer, the potential risks associated with such transfer, and the probable risks of not being transferred have been explained to me, and I fully understand them. The doctor has explained that, in my case, the benefits of transfer outweigh the risks. I agree to be transferred. I authorize the performance of emergency medical procedures and treatments upon me in both transit and upon arrival at the receiving facility. Additionally, I authorize the release of any and all medical records to the receiving facility and request they be transported with me, if possible. I understand that the safest mode of transportation during a medical emergency is an ambulance and that the Hospital advocates the use of this mode of transport.  Risks of traveling to the receiving facility by car, including absence of medical control, life sustaining equipment, such as oxygen, and medical personnel has been explained to me and I fully understand them. (CAPRI CORRECT BOX BELOW)  [  ]  I consent to the stated transfer and to be transported by ambulance/helicopter. [  ]  I consent to the stated transfer, but refuse transportation by ambulance and accept full responsibility for my transportation by car. I understand the risks of non-ambulance transfers and I exonerate the Hospital and its staff from any deterioration in my condition that results from this refusal.    X___________________________________________    DATE  10/20/23  TIME________  Signature of patient or legally responsible individual signing on patient behalf           RELATIONSHIP TO PATIENT_________________________          Provider Certification    NAME Cole Claudio                                        [facility] 1935                              MRN [id number]    A medical screening exam was performed on the above named patient. Based on the examination:    Condition Necessitating Transfer The primary encounter diagnosis was Epidural hematoma ([address]). Diagnoses of Abrasion of scalp, initial encounter, Fall, initial encounter, and Hypertension were also pertinent to this visit.     Patient Condition: The patient has been stabilized such that within reasonable medical probability, no material deterioration of the patient condition or the condition of the unborn child(lebron) is likely to result from the transfer    Reason for Transfer: Level of Care needed not available at this facility (trauma)    Transfer Requirements: [address] available and qualified personnel available for treatment as acknowledged by    Agreed to accept transfer and to provide appropriate medical treatment as acknowledged by       [employer]  Appropriate medical records of the examination and treatment of the patient are provided at the time of transfer   [address] COMPLETED _______  Transfer will be performed by qualified personnel from    and appropriate transfer equipment as required, including the use of necessary and appropriate life support measures. Provider Certification: I have examined the patient and explained the following risks and benefits of being transferred/refusing transfer to the patient/family:  Risk of worsening condition, Unanticipated needs of medical equipment and personnel during transport, The possibility of a transport vehicle being unavailable, General risk, such as traffic hazards, adverse weather conditions, rough terrain or turbulence, possible failure of equipment (including vehicle or aircraft), or consequences of actions of persons outside the control of the transport personnel      Based on these reasonable risks and benefits to the patient and/or the unborn child(lebron), and based upon the information available at the time of the patient’s examination, I certify that the medical benefits reasonably to be expected from the provision of appropriate medical treatments at another medical facility outweigh the increasing risks, if any, to the individual’s medical condition, and in the case of labor to the unborn child, from effecting the transfer.     X____________________________________________ DATE 10/20/23        TIME_______      ORIGINAL - SEND TO MEDICAL RECORDS   COPY - SEND WITH PATIENT DURING TRANSFER

## 2023-10-20 NOTE — ED PROVIDER NOTES
History  Chief Complaint   Patient presents with    Fall     Per EMS mechanical fall abrasion and contusion to back of head -thinners -loc pt has no complaints at this time      80year-old male presents for evaluation of mechanical fall. Patient tripped and fell backward striking his head. Denies loss of consciousness. Patient is not on any blood thinners. Denies headache, neck pain or neck stiffness, focal numbness or weakness, nausea vomiting, traumatic injuries. Last tetanus was in . History provided by:  Patient  Fall  Associated symptoms: no abdominal pain, no chest pain, no nausea and no vomiting        Prior to Admission Medications   Prescriptions Last Dose Informant Patient Reported? Taking? alprostadil (MUSE) 1000 MCG pellet   No No   Si each (1,000 mcg total) by Transurethral route as needed for erectile dysfunction use no more than 3 times per week. 3 boxes of 6 pllt   alprostadil (MUSE) 1000 MCG pellet   No No   Si each (1,000 mcg total) by Transurethral route as needed for erectile dysfunction use no more than 3 times per week. 3 boxes of 6 pllt   amLODIPine-benazepril (LOTREL 5-20) 5-20 MG per capsule   Yes No   Sig: Take 1 capsule by mouth daily   glyBURIDE-metFORMIN (GLUCOVANCE) 5-500 MG per tablet   Yes No   Patient not taking: Reported on 2022   metFORMIN (GLUCOPHAGE) 500 mg tablet   Yes No   Sig: Take 1 tablet by mouth Twice daily   simvastatin (ZOCOR) 20 mg tablet   Yes No   Sig: Take 1 tablet by mouth daily   Patient not taking: Reported on 2022   simvastatin (ZOCOR) 40 mg tablet   Yes No      Facility-Administered Medications: None       Past Medical History:   Diagnosis Date    BPH with obstruction/lower urinary tract symptoms     Combined arterial insufficiency and corporo-venous occlusive erectile dysfunction        History reviewed. No pertinent surgical history.     Family History   Problem Relation Age of Onset    Hypertension Family      I have reviewed and agree with the history as documented. E-Cigarette/Vaping     E-Cigarette/Vaping Substances     Social History     Tobacco Use    Smoking status: Former    Smokeless tobacco: Never   Substance Use Topics    Alcohol use: No    Drug use: No       Review of Systems   Constitutional:  Negative for activity change, appetite change, fatigue and fever. HENT:  Negative for congestion, dental problem, ear pain, rhinorrhea and sore throat. Eyes:  Negative for pain and redness. Respiratory:  Negative for chest tightness, shortness of breath and wheezing. Cardiovascular:  Negative for chest pain and palpitations. Gastrointestinal:  Negative for abdominal pain, blood in stool, constipation, diarrhea, nausea and vomiting. Endocrine: Negative for cold intolerance and heat intolerance. Genitourinary:  Negative for dysuria, frequency and hematuria. Musculoskeletal:  Negative for arthralgias and myalgias. Skin:  Positive for wound. Negative for color change, pallor and rash. Neurological:  Negative for weakness and numbness. Hematological:  Does not bruise/bleed easily. Psychiatric/Behavioral:  Negative for agitation, hallucinations and suicidal ideas. Physical Exam  Physical Exam  Vitals and nursing note reviewed. Constitutional:       Appearance: He is well-developed. HENT:      Head: Normocephalic. Comments: Posterior scalp abrasion no active bleeding. No facial bone tenderness palpation, no raccoon eyes no saxena sign no hemotympanum. Eyes:      Comments: Patient has painless full range of motion in both her eyes. Normal visual fields. No hyphema noted. Neck:      Trachea: No tracheal deviation. Comments: Patient is nontender palpation over her cervical, thoracic, lumbar spines. There is no step-offs, no deformities. Cardiovascular:      Comments: No JVD noted. Heart sounds are normal. Regular rate rate and rhythm. Symmetric strong distal pulses.   Pulmonary:      Effort: Pulmonary effort is normal.      Breath sounds: Normal breath sounds. Chest:      Chest wall: No tenderness. Abdominal:      Comments: No external signs of trauma noted on the abdomen/pelvis. Patient is nontender palpation of the abdomen. There is no rebound, guarding, CVA tenderness. Abdomen is nondistended. Pelvis stable, nttp. Musculoskeletal:      Comments: Right upper extremity has full range of motion without pain. There is no tenderness palpation noted. Patient has no external signs of trauma. Patient is neurovascularly intact in this extremity. Left upper extremity has full range of motion without pain. There is no tenderness palpation noted. Patient has no external signs of trauma. Patient is neurovascularly intact in this extremity. Right lower extremity has full range of motion without pain. There is no tenderness palpation noted. Patient has no external signs of trauma. Patient is neurovascularly intact in this extremity. Left Lower  extremity has full range of motion without pain. There is no tenderness palpation noted. Patient has no external signs of trauma. Patient is neurovascularly intact in this extremity. Neurological:      Comments: Alert and oriented ×3. Normal mental status exam. Normal cranial nerves II through XII. Normal sensation and strength throughout.  Normal cerebellar exam. GCS 15.   Psychiatric:         Behavior: Behavior normal.         Judgment: Judgment normal.         Vital Signs  ED Triage Vitals   Temperature Pulse Respirations Blood Pressure SpO2   10/20/23 1636 10/20/23 1636 10/20/23 1636 10/20/23 1636 10/20/23 1637   98.5 °F (36.9 °C) 89 18 (!) 179/81 98 %      Temp Source Heart Rate Source Patient Position - Orthostatic VS BP Location FiO2 (%)   10/20/23 1636 10/20/23 1636 10/20/23 1636 10/20/23 1636 --   Oral Monitor Lying Left arm       Pain Score       10/20/23 1636       No Pain           Vitals:    10/20/23 1636   BP: (!) 179/81   Pulse: 89   Patient Position - Orthostatic VS: Lying         Visual Acuity      ED Medications  Medications - No data to display    Diagnostic Studies  Results Reviewed       Procedure Component Value Units Date/Time    CBC and differential [[id number]]     Lab Status: No result Specimen: Blood     Protime-INR [[id number]]     Lab Status: No result Specimen: Blood     APTT [[id number]]     Lab Status: No result Specimen: Blood     Comprehensive metabolic panel [[id number]]     Lab Status: No result Specimen: Blood                    CT head without contrast   Final Result by Ronen Calixto MD (10/20 1813)      1. A 3.4 x 1.9 cm extra-axial heterogeneous hyperdensity along the right occipital lobe, likely representing an extra-axial (epidural) hematoma particularly given the adjacent scalp soft tissue swelling. However, given its heterogeneous appearance this    may also represent an extra-axial mass such as a meningioma. 2. A 0.7 cm hyperdense structure with peripheral calcification in the right medial temporal lobe, possibly representing a cavernoma or calcified aneurysm. I personally discussed this study with Catia Vogel on 10/20/2023 6:10 PM.               Workstation performed: [id number]         CT cervical spine without contrast   Final Result by Ronen Calixto MD (10/20 1813)      No cervical spine fracture or traumatic malalignment.             I personally discussed this study with Catia Vogel on 10/20/2023 6:10 PM.               Workstation performed: [id number]                    Procedures  CriticalCare Time    Date/Time: 10/20/2023 6:19 PM    Performed by: Catia Vogel MD  Authorized by: Catia Vogel MD    Critical care provider statement:     Critical care time (minutes):  35    Critical care time was exclusive of:  Separately billable procedures and treating other patients and teaching time    Critical care was necessary to treat or prevent imminent or life-threatening deterioration of the following conditions:  Trauma    Critical care was time spent personally by me on the following activities:  Blood draw for specimens, obtaining history from patient or surrogate, development of treatment plan with patient or surrogate, discussions with consultants, evaluation of patient's response to treatment, examination of patient, interpretation of cardiac output measurements, ordering and performing treatments and interventions, ordering and review of laboratory studies, ordering and review of radiographic studies, re-evaluation of patient's condition and review of old charts           ED Course                                             Medical Decision Making  Mechanical fall with head trauma-we will CT head rule out acute CNS bleed/skull fracture, CT cervical spine to rule out fractures dislocation, local wound care for patient's abrasion which does not require closure. Patient does not require tetanus vaccination. Asymptomatic hypertension-no work-up indicated at this time. Amount and/or Complexity of Data Reviewed  Labs: ordered. Radiology: ordered. Disposition  Final diagnoses:   Epidural hematoma (HCC)   Abrasion of scalp, initial encounter   Fall, initial encounter   Hypertension     Time reflects when diagnosis was documented in both MDM as applicable and the Disposition within this note       Time User Action Codes Description Comment    10/20/2023  6:30 PM Gemini Bloodgood Add [S06. 4XAA] Epidural hematoma ([address])     10/20/2023  6:30 PM Nicole Salazar Add [S00.01XA] Abrasion of scalp, initial encounter     10/20/2023  6:30 PM Gemini Bloodgood Add [B26. KEPB] Fall, initial encounter     10/20/2023  6:31 PM Gemini Bloodgood Add [I10] Hypertension           ED Disposition       ED Disposition   Transfer to Another Facility-In Network    Condition   --    Date/Time   Fri Oct 20, 2023  6:09 PM    Comment   Chantale Shaver should be transferred out to [facility].                MD Documentation      Flowsheet Row Most Recent Value   Patient Condition The patient has been stabilized such that within reasonable medical probability, no material deterioration of the patient condition or the condition of the unborn child(lebron) is likely to result from the transfer   Reason for Transfer Level of Care needed not available at this facility  [trauma]   Benefits of Transfer Specialized equipment and/or services available at the receiving facility (Include comment)________________________  [trauma]   Risks of Transfer Potential for delay in receiving treatment, Potential deterioration of medical condition, Loss of IV, Increased discomfort during transfer, Possible worsening of condition or death during transfer   Accepting Physician [address] Name, Lendell Sandifer Sending MD [facility]   Provider Certification Risk of worsening condition, Unanticipated needs of medical equipment and personnel during transport, The possibility of a transport vehicle being unavailable, General risk, such as traffic hazards, adverse weather conditions, rough terrain or turbulence, possible failure of equipment (including vehicle or aircraft), or consequences of actions of persons outside the control of the transport personnel          RN Documentation      Flowsheet Row Most [facility] Name, Lendell Sandifer          Follow-up Information    None         Patient's Medications   Discharge Prescriptions    No medications on file       No discharge procedures on file.     PDMP Review       None            ED Provider  Electronically Signed by             Catia Vogel MD  10/20/23 1238

## 2023-10-21 ENCOUNTER — APPOINTMENT (INPATIENT)
Dept: RADIOLOGY | Facility: HOSPITAL | Age: 88
DRG: 084 | End: 2023-10-21
Payer: MEDICARE

## 2023-10-21 VITALS
HEIGHT: 67 IN | RESPIRATION RATE: 22 BRPM | SYSTOLIC BLOOD PRESSURE: 131 MMHG | OXYGEN SATURATION: 92 % | BODY MASS INDEX: 29.03 KG/M2 | TEMPERATURE: 97.8 F | HEART RATE: 53 BPM | WEIGHT: 185 LBS | DIASTOLIC BLOOD PRESSURE: 51 MMHG

## 2023-10-21 LAB
ABO GROUP BLD: NORMAL
ANION GAP SERPL CALCULATED.3IONS-SCNC: 7 MMOL/L
BLD GP AB SCN SERPL QL: NEGATIVE
BUN SERPL-MCNC: 22 MG/DL (ref 5–25)
CALCIUM SERPL-MCNC: 8.9 MG/DL (ref 8.4–10.2)
CHLORIDE SERPL-SCNC: 101 MMOL/L (ref 96–108)
CO2 SERPL-SCNC: 26 MMOL/L (ref 21–32)
CREAT SERPL-MCNC: 0.91 MG/DL (ref 0.6–1.3)
ERYTHROCYTE [DISTWIDTH] IN BLOOD BY AUTOMATED COUNT: 13 % (ref 11.6–15.1)
GFR SERPL CREATININE-BSD FRML MDRD: 74 ML/MIN/1.73SQ M
GLUCOSE SERPL-MCNC: 183 MG/DL (ref 65–140)
GLUCOSE SERPL-MCNC: 193 MG/DL (ref 65–140)
GLUCOSE SERPL-MCNC: 227 MG/DL (ref 65–140)
HCT VFR BLD AUTO: 33.8 % (ref 36.5–49.3)
HGB BLD-MCNC: 12.1 G/DL (ref 12–17)
MCH RBC QN AUTO: 31.3 PG (ref 26.8–34.3)
MCHC RBC AUTO-ENTMCNC: 35.8 G/DL (ref 31.4–37.4)
MCV RBC AUTO: 87 FL (ref 82–98)
PLATELET # BLD AUTO: 245 THOUSANDS/UL (ref 149–390)
PMV BLD AUTO: 9.9 FL (ref 8.9–12.7)
POTASSIUM SERPL-SCNC: 3.8 MMOL/L (ref 3.5–5.3)
RBC # BLD AUTO: 3.87 MILLION/UL (ref 3.88–5.62)
RH BLD: POSITIVE
SODIUM SERPL-SCNC: 134 MMOL/L (ref 135–147)
SPECIMEN EXPIRATION DATE: NORMAL
WBC # BLD AUTO: 9.34 THOUSAND/UL (ref 4.31–10.16)

## 2023-10-21 PROCEDURE — G1004 CDSM NDSC: HCPCS

## 2023-10-21 PROCEDURE — 80048 BASIC METABOLIC PNL TOTAL CA: CPT | Performed by: SURGERY

## 2023-10-21 PROCEDURE — 99238 HOSP IP/OBS DSCHRG MGMT 30/<: CPT | Performed by: SURGERY

## 2023-10-21 PROCEDURE — 97163 PT EVAL HIGH COMPLEX 45 MIN: CPT

## 2023-10-21 PROCEDURE — 99223 1ST HOSP IP/OBS HIGH 75: CPT | Performed by: PHYSICIAN ASSISTANT

## 2023-10-21 PROCEDURE — 86901 BLOOD TYPING SEROLOGIC RH(D): CPT | Performed by: SURGERY

## 2023-10-21 PROCEDURE — 86850 RBC ANTIBODY SCREEN: CPT | Performed by: SURGERY

## 2023-10-21 PROCEDURE — 97167 OT EVAL HIGH COMPLEX 60 MIN: CPT

## 2023-10-21 PROCEDURE — 82948 REAGENT STRIP/BLOOD GLUCOSE: CPT

## 2023-10-21 PROCEDURE — 70450 CT HEAD/BRAIN W/O DYE: CPT

## 2023-10-21 PROCEDURE — 86900 BLOOD TYPING SEROLOGIC ABO: CPT | Performed by: SURGERY

## 2023-10-21 PROCEDURE — 85027 COMPLETE CBC AUTOMATED: CPT | Performed by: SURGERY

## 2023-10-21 RX ORDER — LEVETIRACETAM 500 MG/1
500 TABLET ORAL 2 TIMES DAILY
Qty: 14 TABLET | Refills: 0 | Status: SHIPPED | OUTPATIENT
Start: 2023-10-21

## 2023-10-21 RX ORDER — ACETAMINOPHEN 325 MG/1
650 TABLET ORAL EVERY 6 HOURS PRN
Qty: 60 TABLET | Refills: 0 | Status: SHIPPED | OUTPATIENT
Start: 2023-10-21

## 2023-10-21 RX ORDER — LEVETIRACETAM 500 MG/1
500 TABLET ORAL 2 TIMES DAILY
Qty: 14 TABLET | Refills: 0 | Status: SHIPPED | OUTPATIENT
Start: 2023-10-21 | End: 2023-10-21 | Stop reason: SDUPTHER

## 2023-10-21 RX ADMIN — AMLODIPINE BESYLATE 5 MG: 5 TABLET ORAL at 08:33

## 2023-10-21 RX ADMIN — LEVETIRACETAM 500 MG: 500 TABLET, FILM COATED ORAL at 08:33

## 2023-10-21 RX ADMIN — LEVETIRACETAM 500 MG: 500 TABLET, FILM COATED ORAL at 00:41

## 2023-10-21 RX ADMIN — INSULIN LISPRO 2 UNITS: 100 INJECTION, SOLUTION INTRAVENOUS; SUBCUTANEOUS at 12:27

## 2023-10-21 NOTE — CONSULTS
[address] [place] [address]  Consult  Name: Nilson Enrique 80 y.o. male I MRN: [id number]  Unit/Bed#: [facility] 047-26 I Date of Admission: 10/20/2023   Date of Service: 10/21/2023 I Hospital Day: 1    Inpatient consult to Neurosurgery  Consult performed by: Jeanmarie Rodriguez PA-C  Consult ordered by: Florian Dowell DO          Assessment/Plan   * Intracranial epidural hematoma [facility])  Assessment & Plan  Patient presented s/p fall on the steps at home  Patient was found to have extra-axial lesion suspicious for hematoma vs meningioma  +headstrike, -LOC, No AC/AP medication     Imaging:   CT head 10/20/2023: Stable CT of the brain with isodense to slightly hyperdense extra-axial lesion in the paramedian right parietal vertex with differentials considering extra-axial hemorrhage versus meningioma. 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 Plan:   ongoing frequent neurological checks. Recommend STAT CT head for decline in GCS >2 points in 1 hour. Keppra 500mg BID for seizure ppx per trauma team   DVT ppx: SCD's  Hold all AC/AP medication at this time  No reported thinners at home   PT/OT evaluation. Ongoing medical management and pain control per primary team  CM following for dispo planning  Will order MRI brain in 1 month for follow up regarding extra-axial lesion to determine possible meningioma vs hematoma. Do not suspect patient will require any surgical intervention   Neurosurgery will sign off. Plan for outpatient follow up. Call with questions or concerns. History of Present Illness   HPI: Nilson Enrique is a 80 y.o. male with PMH including BPH, erectile dysfunction who presents after a fall at home. Patient states he was attempting on the stairs when he turned around and fell landing on his buttock also striking his head. He is not amnestic to the events and can remember them clearly.   He states he did not lose consciousness. He reports no blood thinner use at home. He denies any headaches, change in vision, trouble speech. He only admits to some mild buttock pain today secondary to following. He denies any neurologic deficits or complaints. Review of Systems   Constitutional:  Negative for activity change, chills and fever. HENT: Negative. Eyes:  Negative for visual disturbance. Respiratory: Negative. Cardiovascular: Negative. Gastrointestinal: Negative. Musculoskeletal:  Negative for arthralgias, back pain and gait problem. Neurological:  Negative for dizziness, seizures, weakness and headaches. Psychiatric/Behavioral:  Negative for agitation, behavioral problems and confusion. Historical Information   Past Medical History:   Diagnosis Date    BPH with obstruction/lower urinary tract symptoms     Combined arterial insufficiency and corporo-venous occlusive erectile dysfunction      History reviewed. No pertinent surgical history. Social History     Substance and Sexual Activity   Alcohol Use No     Social History     Substance and Sexual Activity   Drug Use No     Social History     Tobacco Use   Smoking Status Former   Smokeless Tobacco Never     Family History   Problem Relation Age of Onset    Hypertension Family        Meds/Allergies   all current active meds have been reviewed, current meds:   Current Facility-Administered Medications   Medication Dose Route Frequency    acetaminophen (TYLENOL) tablet 650 mg  650 mg Oral Q6H PRN    amLODIPine (NORVASC) tablet 5 mg  5 mg Oral Daily    insulin lispro (HumaLOG) 100 units/mL subcutaneous injection 1-6 Units  1-6 Units Subcutaneous TID AC    levETIRAcetam (KEPPRA) tablet 500 mg  500 mg Oral BID    ondansetron (ZOFRAN) injection 4 mg  4 mg Intravenous Q6H PRN    pravastatin (PRAVACHOL) tablet 80 mg  80 mg Oral Daily With Dinner   , and PTA meds:   Prior to Admission Medications   Prescriptions Last Dose Informant Patient Reported? Taking? alprostadil (MUSE) 1000 MCG pellet   No No   Si each (1,000 mcg total) by Transurethral route as needed for erectile dysfunction use no more than 3 times per week. 3 boxes of 6 pllt   alprostadil (MUSE) 1000 MCG pellet   No No   Si each (1,000 mcg total) by Transurethral route as needed for erectile dysfunction use no more than 3 times per week. 3 boxes of 6 pllt   amLODIPine-benazepril (LOTREL 5-20) 5-20 MG per capsule   Yes No   Sig: Take 1 capsule by mouth daily   chlorthalidone 25 mg tablet   Yes No   Sig: Take 25 mg by mouth daily   glyBURIDE-metFORMIN (GLUCOVANCE) 5-500 MG per tablet   Yes No   Patient not taking: Reported on 2022   metFORMIN (GLUCOPHAGE) 500 mg tablet   Yes No   Sig: Take 1 tablet by mouth Twice daily   simvastatin (ZOCOR) 20 mg tablet   Yes No   Sig: Take 1 tablet by mouth daily   Patient not taking: Reported on 2022   simvastatin (ZOCOR) 40 mg tablet   Yes No      Facility-Administered Medications: None     No Known Allergies    Objective   I/O         10/19 0701  10/20 0700 10/20 0701  10/21 0700 10/21 0701  10/22 0700    P. O.   180    Total Intake(mL/kg)   180 (2.1)    Net   +180           Unmeasured Urine Occurrence  1 x             Physical Exam  Constitutional:       Appearance: Normal appearance. He is well-developed. HENT:      Head: Normocephalic and atraumatic. Eyes:      Extraocular Movements: Extraocular movements intact and EOM normal.      Pupils: Pupils are equal, round, and reactive to light. Neck:      Vascular: No JVD. Cardiovascular:      Rate and Rhythm: Normal rate. Pulmonary:      Effort: Pulmonary effort is normal.   Musculoskeletal:         General: No deformity. Normal range of motion. Cervical back: Normal range of motion and neck supple. Skin:     General: Skin is warm and dry. Neurological:      Mental Status: He is alert and oriented to person, place, and time. Cranial Nerves: No cranial nerve deficit. Motor: Motor strength is normal.     Deep Tendon Reflexes: Reflexes are normal and symmetric. Psychiatric:         Speech: Speech normal.         Behavior: Behavior normal.         Thought Content: Thought content normal.       Neurologic Exam     Mental Status   Oriented to person, place, and time. Attention: normal.   Speech: speech is normal   Level of consciousness: alert  Knowledge: good. Normal comprehension. Cranial Nerves     CN III, IV, VI   Pupils are equal, round, and reactive to light. Extraocular motions are normal.   Upgaze: normal  Downgaze: normal    CN V   Facial sensation intact. CN VII   Facial expression full, symmetric. CN VIII   CN VIII normal.   Hearing: intact    CN XII   CN XII normal.     Motor Exam   Muscle bulk: normal  Right arm tone: normal  Left arm tone: normal  Right leg tone: normal  Left leg tone: normal    Strength   Strength 5/5 throughout. Sensory Exam   Light touch normal.     Gait, Coordination, and Reflexes     Tremor   Resting tremor: absent  Action tremor: absent    Vitals:Blood pressure 131/51, pulse (!) 53, temperature 97.8 °F (36.6 °C), resp. rate 22, height 5' 7" (1.702 m), weight 83.9 kg (185 lb), SpO2 94 %. ,Body mass index is 28.98 kg/m².      Lab Results:   Results from last 7 days   Lab Units 10/21/23  0512 10/20/23  1835   WBC Thousand/uL 9.34 15.64*   HEMOGLOBIN g/dL 12.1 13.3   HEMATOCRIT % 33.8* 38.8   PLATELETS Thousands/uL 245 273   MONO PCT %  --  6   EOS PCT %  --  0     Results from last 7 days   Lab Units 10/21/23  0512 10/20/23  1835   SODIUM mmol/L 134* 136   POTASSIUM mmol/L 3.8 4.1   CHLORIDE mmol/L 101 100   CO2 mmol/L 26 27   BUN mg/dL 22 25   CREATININE mg/dL 0.91 0.87   CALCIUM mg/dL 8.9 9.9   ALK PHOS U/L  --  65   ALT U/L  --  28   AST U/L  --  19             Results from last 7 days   Lab Units 10/20/23  1835   INR  0.97   PTT seconds 30     No results found for: "TROPONINT"  ABG:No results found for: "PHART", "VNP6UHA", "PO2ART", "FEA6FZS", "K8UTFTAP", "BEART", "SOURCE"    Imaging Studies: I have personally reviewed pertinent reports. and I have personally reviewed pertinent films in PACS    CT head wo contrast    Result Date: 10/21/2023  Impression: Stable CT of the brain with isodense to slightly hyperdense extra-axial lesion in the paramedian right parietal vertex with differential considerations of recent extra-axial hemorrhage versus meningioma. Definitive characterization with gadolinium-enhanced MRI of the brain is recommended. 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 Workstation performed: [id number]     CT head without contrast    Addendum Date: 10/20/2023    ADDENDUM: Further evaluation with repeat CT study in 4-6 hours or characterization with MRI is recommended. I personally discussed this study with Linh Hollis on 10/20/2023 6:10 PM.     Result Date: 10/20/2023  Impression: 1. A 3.4 x 1.9 cm extra-axial heterogeneous hyperdensity along the right occipital lobe, likely representing an extra-axial (epidural) hematoma particularly given the adjacent scalp soft tissue swelling. However, given its heterogeneous appearance this may also represent an extra-axial mass such as a meningioma. 2. A 0.7 cm hyperdense structure with peripheral calcification in the right medial temporal lobe, possibly representing a cavernoma or calcified aneurysm. I personally discussed this study with Linh Hollis on 10/20/2023 6:10 PM. Workstation performed: [id number]     CT cervical spine without contrast    Result Date: 10/20/2023  Impression: No cervical spine fracture or traumatic malalignment. I personally discussed this study with Linh Hollis on 10/20/2023 6:10 PM. Workstation performed: [id number]       EKG, Pathology, and Other Studies: I have personally reviewed pertinent reports.       VTE Prophylaxis: Sequential compression device Azael Jackson     Code Status: Level 1 - Full Code  Advance Directive and Living Will:      Power of :    POLST:      Counseling / Coordination of Care  I spent 30 minutes with the patient.

## 2023-10-21 NOTE — CASE MANAGEMENT
Case Management Assessment & Discharge Planning Note    Patient name [facility]arlos Hicks  [facility] [id number]  : 1935 Date 10/21/2023       Current Admission Date: 10/20/2023  Current Admission Diagnosis:Intracranial epidural hematoma [facility])   Patient Active Problem List    Diagnosis Date Noted    Intracranial epidural hematoma ([address]) 10/20/2023    Fall 10/20/2023    Combined arterial insufficiency and corporo-venous occlusive erectile dysfunction     Avulsion of skin of middle finger 12/10/2020    Lung nodule 2018    BPH with obstruction/lower urinary tract symptoms 2018    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-related cataract 10/19/2017    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    Type 2 diabetes mellitus without complication ([address])     Melanoma of skin ([address]) 2010    Corporo-venous occlusive erectile dysfunction 2010    Hyperlipidemia 2010    Hypertension, benign 2010      LOS (days): 1  Geometric Mean LOS (GMLOS) (days):   Days to GMLOS:     OBJECTIVE:    Risk of Unplanned Readmission Score: 8.71         Current admission status: Inpatient       Preferred Pharmacy:   [address] [place] [address], [address] [place] [address],[address]  [address] [facility]  Phone: [phone] Fax: [phone] - [address] [facility], [address] [place] [address]  Phone: [phone] Fax: [phone]    Primary Care Provider: Titus Diaz MD    Primary Insurance: MEDICARE  Secondary Insurance:  FOR LIFE    ASSESSMENT:  Active Health Care Proxies    There are no active Health Care Proxies on file.        Advance Directives  Does patient have a [address]?: No  Was patient offered paperwork?: Yes  Does patient currently have a Health Care decision maker?: Yes, please see Health Care Proxy section  Does patient have Advance Directives?: No  Was patient offered paperwork?: Yes  Primary Contact: Yasmin Johnson (Child) [phone]    Readmission Root Cause  30 Day Readmission: No    Patient Information  Admitted from[de-identified] Home  Mental Status: Alert  During Assessment patient was accompanied by: Not accompanied during assessment  Assessment information provided by[de-identified] Patient  Primary Caregiver: Self  Support Systems: Self, Daughter, Family members  [place] of Residence: [address] [place] [address] do you live in?: [address]  In the last 12 months, was there a time when you were not able to pay the mortgage or rent on time?: No  In the last 12 months, how many places have you lived?: 1  In the last 12 months, was there a time when you did not have a steady place to sleep or slept in a shelter (including now)?: No  Homeless/housing insecurity resource given?: N/A  Living Arrangements: Lives Alone  Is patient a ?: No    Activities of Daily Living Prior to Admission  Functional Status: Independent  Completes ADLs independently?: Yes  Ambulates independently?: Yes  Does patient use assisted devices?: No  Does patient currently own DME?: No  Does patient have a history of Outpatient Therapy (PT/OT)?: Yes  Does the patient have a history of Short-Term Rehab?: Yes  Does patient have a history of HHC?: No  Does patient currently have [address] [facility]?: No    Patient Information Continued  Income Source: Pension/residential  Does patient have prescription coverage?: Yes  Within the past 12 months, you worried that your food would run out before you got the money to buy more.: Never true  Within the past 12 months, the food you bought just didn't last and you didn't have money to get more.: Never true  Food insecurity resource given?: N/A  Does patient receive dialysis treatments?: No  Does patient have a history of substance abuse?: No  Does patient have a history of Mental Health Diagnosis?: No    Means of Transportation  Means of Transport to Appts[de-identified] Drives Self  In the past 12 months, has lack of transportation kept you from medical appointments or from getting medications?: No  In the past 12 months, has lack of transportation kept you from meetings, work, or from getting things needed for daily living?: No  Was application for public transport provided?: N/A    DISCHARGE DETAILS:    Discharge planning discussed with[de-identified] Jarett Girard (Child) [phone]  Freedom of Choice: Yes     CM contacted family/caregiver?: Yes  Were Treatment Team discharge recommendations reviewed with patient/caregiver?: Yes     Were patient/caregiver advised of the risks associated with not following Treatment Team discharge recommendations?: Yes    Contacts  Patient Contacts: Jarett Girard (Child) [phone]  Relationship to Patient[de-identified] Family  Contact Method: Phone  Phone Number: [phone]  Reason/Outcome: Continuity of Care, Emergency [address]         Is the patient interested in [facility] at discharge?: Yes  [facility] requested[de-identified] Physical Therapy, Nursing, Occupational [facility] Name[de-identified] [facility] External Referral Reason (only applicable if external HHA name selected): Patient has established relationship with provider  [address] [facility] Provider[de-identified] PCP  Home Health Services Needed[de-identified] Strengthening/Theraputic Exercises to Improve Function, Gait/ADL Training, Evaluate Functional Status and Safety  Homebound Criteria Met[de-identified] Uses an Assist Device (i.e. cane, walker, etc), Requires the Assistance of Another Person for Safe Ambulation or to Leave the Home  Supporting Clincal Findings[de-identified] Fatigues Easliy in [employer], Limited Endurance    DME Referral Provided  Referral made for DME?: Yes  DME referral completed for the following items[de-identified] Rekha Hahn  DME Supplier Name[de-identified] [email or web address]    Other Referral/Resources/Interventions Provided:  Interventions: HHC, DME    Treatment Team Recommendation: Home with [address]  Discharge Destination Plan[de-identified] Home with [address]         CM spoke to pt's dtr to discuss d/c planning. Pt was evaluated by OT/PT and recommended for a home d/c with [facility]. CM placed referrals and pt was accepted by [address] [place] [address]. Pt doesn't require any DME  Pt's dtr will transport home    CM reviewed d/c planning process including the following: identifying help at home, patient preference for d/c planning needs, Discharge Lounge, [facility] Meds to Bed program, availability of treatment team to discuss questions or concerns patient and/or family may have regarding understanding medications and recognizing signs and symptoms once discharged. CM also encouraged patient to follow up with all recommended appointments after discharge. Patient advised of importance for patient and family to participate in managing patient’s medical well being.

## 2023-10-21 NOTE — PLAN OF CARE
Problem: OCCUPATIONAL THERAPY ADULT  Goal: Performs self-care activities at highest level of function for planned discharge setting. See evaluation for individualized goals. Description: Treatment Interventions: ADL retraining, Functional transfer training, Endurance training, Cognitive reorientation, Patient/family training, Equipment evaluation/education, Compensatory technique education, Energy conservation, Activityengagement  Equipment Recommended: Shower/Tub chair with back ($)       See flowsheet documentation for full assessment, interventions and recommendations. Note: Limitation: Decreased ADL status, Decreased Safe judgement during ADL, Decreased cognition, Decreased endurance, Decreased self-care trans, Decreased high-level ADLs  Prognosis: Good  Assessment: 79 YO Male SEEN FOR INITIAL OCCUPATIONAL THERAPY EVALUATION FOLLOWING ADMISSION TO [facility] S/ FALL RESULTING IN INTRACRANIAL EPIDURAL HEMATOMA. PROBLEMS LIST/PMH INCLUDES BPH with obstruction/lower urinary tract symptoms and Combined arterial insufficiency and corporo-venous occlusive erectile dysfunction. PT IS FROM HOME ALONE WHERE HE REPORTS BEING INDEPENDENT WITH ADLS/IADLS/DRIVING PTA. PT CURRENTLY REQUIRES OVERALL SUPERVISION-MIN A WITH ADLS, TRANSFERS AND FUNCTIONAL MOBILITY WITHOUT USE OF AD. PT IS LIMITED 2' FATIGUE, IMPAIRED BALANCE, FALL RISK , LIMITED SAFETY AWARENESS/INSIGHT/JUDGEMENT, IMPULSIVE, LIMITED FAMILY/FRIEND SUPPORT , and OVERALL LIMITED ACTIVITY TOLERANCE. PT EDUCATED ON DEEP BREATHING TECHNIQUES T/O ACTIVITY, SLOWING OF PACE, ENERGY CONSERVATION TECHNIQUES FOR CARRY OVER UPON D/C, INCREASED FAMILY SUPPORT, and CONTINUE PARTICIPATION IN SELF-CARE/MOBILITY WITH STAFF [address] . The patient's raw score on the AM-PAC Daily Activity Inpatient Short Form is 19. A raw score of greater than or equal to 19 suggests the patient may benefit from discharge to home.  Please refer to the recommendation of the Occupational Therapist for safe discharge planning. FROM AN OCCUPATIONAL THERAPY PERSPECTIVE, PT CAN RETURN HOME WITH INCREASED FAMILY SUPPORT + HOME OT SERVICES UPON D/C. WILL CONT TO FOLLOW TO ADDRESS THE BELOW DESCRIBED GOALS.      OT Discharge Recommendation: Home with home health rehabilitation

## 2023-10-21 NOTE — PHYSICAL THERAPY NOTE
Physical Therapy Evaluation     Patient's Name: Ramy Caldwell    Admitting Diagnosis  Fall, initial encounter [W19. XXXA]  Unspecified multiple injuries, initial encounter [T07. 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[address])    Corporo-venous occlusive erectile dysfunction    Hyperlipidemia    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-related cataract    Hypertension, benign    Type 2 diabetes mellitus without complication (HCC)    Melanoma of skin ([address])    Avulsion of skin of middle finger    Lung nodule    Combined arterial insufficiency and corporo-venous occlusive erectile dysfunction    Intracranial epidural hematoma ([address])    Fall       Past Medical History  Past Medical History:   Diagnosis Date    BPH with obstruction/lower urinary tract symptoms     Combined arterial insufficiency and corporo-venous occlusive erectile dysfunction        Past Surgical History  History reviewed. No pertinent surgical history. 10/21/23 0931   PT Last Visit   PT Visit Date 10/21/23   Note Type   Note type Evaluation   Pain Assessment   Pain Assessment Tool 0-10   Pain Score No Pain   Pain Location/Orientation Location: Head   Restrictions/Precautions   Other Precautions Cognitive; Impulsive; Fall Risk;Multiple lines   Home Living   Type of [address] [place] [address] One level;Stairs to enter with rails  (3 BETSEY)   Additional Comments Pt reports he did not require use AD PTA, reports he walks 2 miles a day on the trail. Prior Function   Level of [place] Independent with functional mobility   Lives With (S)  Alone   Receives Help From Family  (2 local daughters, pt reports he sees them at least twice a week.  Daughters also prepare meals)   Falls in the last 6 months 1 to 4  (pt reports only this fall)   Vocational Retired   General   Family/Caregiver Present No   Cognition   Orientation Level Oriented X4   Subjective   Subjective Pt willing and agreeable to PT session. "I would like to move"   RLE Assessment   RLE Assessment WFL   LLE Assessment   LLE Assessment WFL   Coordination   Movements are Fluid and Coordinated 0   Coordination and Movement Description impulsive   Bed Mobility   Supine to Sit 5  Supervision   Additional items Assist x 1   Sit to Supine Unable to assess   Additional Comments Pt left resting in chair, call bell in reach, chair alarm active   Transfers   Sit to Stand 5  Supervision  (CGA)   Additional items Assist x 1   Stand to Sit 5  Supervision  (CGA)   Additional items Assist x 1   Ambulation/Elevation   Gait pattern Excessively slow; Shuffling;Decreased foot clearance; Forward Flexion   Gait Assistance 5  Supervision  (CGA)   Additional items Assist x 1   Assistive Device None   Distance 150+150   Balance   Static Sitting Fair -   Dynamic Sitting Fair -   Static Standing Poor +   Dynamic Standing Poor +   Ambulatory Poor +   Activity Tolerance   Activity Tolerance Patient tolerated treatment well   Medical Staff Made Aware OT and CM for D/C planning   Nurse Made Aware yes, nsg gave clearance to work with pt   Assessment   Prognosis Fair   Problem List Decreased strength;Decreased range of motion;Decreased endurance; Impaired balance;Decreased mobility; Decreased coordination;Decreased cognition; Impaired judgement;Decreased safety awareness;Pain;Orthopedic restrictions   Assessment Pt is 80 y.o. male seen for PT evaluation s/p admit to [address] [place] [address] on 10/20/2023 w/ Intracranial epidural hematoma ([address] PT consulted to assess pt's functional mobility and d/c needs. Order placed for PT eval and tx, w/ up w/ A order. Comorbidities affecting pt's physical performance at time of assessment include:  has a past medical history of BPH with obstruction/lower urinary tract symptoms and Combined arterial insufficiency and corporo-venous occlusive erectile dysfunction.  PTA, pt was ambulates community distances and elevations, has 3 BETSEY, and retired. Personal factors affecting pt at time of IE include: stairs to enter home, limited home support, and positive fall history. Please find objective findings from PT assessment regarding body systems outlined above with impairments and limitations including gait deviations, decreased activity tolerance, decreased functional mobility tolerance, decreased safety awareness, impaired judgement, and fall risk. The following objective measures performed on IE also reveal limitations: The patient's AM-PAC Basic Mobility Inpatient Short Form Raw Score is 20, Standardized Score is 43.99. A standardized score greater than 42.9 suggests the patient may benefit from discharge to home. Please also refer to the recommendation of the Physical Therapist for safe discharge planning. Pt's clinical presentation is currently unstable/unpredictable seen in pt's presentation of ongoing medical workup. Pt to benefit from continued PT tx to address deficits as defined above and maximize level of functional independent mobility and consistency. From PT/mobility standpoint, recommendation at time of d/c would be no rehabilitation needs pending progress in order to facilitate return to PLOF. Goals   Patient Goals To go home   STG Expiration Date   (pt left prior to goals est)   Plan   Treatment/Interventions Spoke to case management;Spoke to nursing;Gait training;Patient/family training; Endurance training;LE strengthening/ROM; Functional transfer training   PT Frequency 3-5x/wk   Discharge Recommendation   PT Discharge Recommendation No rehabilitation needs  (increased support from family)   [address] in Flat Bed Without Bedrails 4   Lying on Back to Sitting on Edge of Flat Bed Without Bedrails 4   Moving Bed to Chair 3   Standing Up From Chair Using Arms 3   Walk in Room 3   Climb 3-5 Stairs With Railing 3   Basic Mobility Inpatient Raw Score 20   Basic Mobility Standardized Score 43.99   Highest Level Of Mobility   -HLM Goal 6: Walk 10 steps or more   JH-HLM Achieved 7: Walk 25 feet or more         Rickey Osorio, PT

## 2023-10-21 NOTE — OCCUPATIONAL THERAPY NOTE
Occupational Therapy Evaluation     Patient Name: Ivy Ocala  [place] Date: 10/21/2023  Problem List  Principal Problem:    Intracranial epidural hematoma [facility])  Active Problems:    Fall    Past Medical History  Past Medical History:   Diagnosis Date    BPH with obstruction/lower urinary tract symptoms     Combined arterial insufficiency and corporo-venous occlusive erectile dysfunction      Past Surgical History  History reviewed. No pertinent surgical history. 10/21/23 0930   OT Last Visit   OT Visit Date 10/21/23   Note Type   Note type Evaluation   Pain Assessment   Pain Assessment Tool 0-10   Pain Score No Pain   Hospital Pain Intervention(s) Repositioned; Ambulation/increased activity; Emotional support   Restrictions/Precautions   Weight Bearing Precautions Per Order No   Other Precautions Chair Alarm; Fall Risk;Hard of hearing   Home Living   Type of [facility] One level;Stairs to enter with rails  (3 BETSEY)   Bathroom Shower/Tub Tub/shower unit   Additional Comments NO USE OF DME AT BASELINE   Prior Function   Level of [place] Independent with ADLs; Independent with functional mobility; Independent with IADLS   Lives With Alone   Receives Help From Family   IADLs Independent with driving; Independent with meal prep; Independent with medication management   Falls in the last 6 months 1 to 4   Vocational Retired   [address] ADLS/MOST IADLS/DRIVING PTA. FAMILY PROVIDES ASSIST FOR HOT MEALS   Reciprocal Relationships LIVES ALONE.  LOCAL SUPPORTIVE DAUGHTERS WHO CHECK IN MULTIPLE TIMES PER WEEK   Service to Others RETIRED   Intrinsic Gratification ENJOYS WALKING 2 MILES DAILY ON THE CANAL TRAIL- REPORTS ALWAYS TAKING HIS CELL PHONE WITH HIM IN CASE OF EMERGENCY   Subjective   Subjective "FEELS GOOD TO BE UP"   ADL   Eating Assistance 7  Independent   Grooming Assistance 7  Independent   UB Bathing Assistance 5  Supervision/Setup   LB Bathing Assistance 4  Minimal Assistance   UB Dressing Assistance 5  Supervision/Setup    [address] Assistance  4  Minimal Assistance   Functional Assistance 4  Minimal Assistance   Bed Mobility   Supine to Sit 5  Supervision   Additional items Assist x 1; Increased time required;Verbal cues   Sit to Supine Unable to assess  (PT LEFT OOB WITH ALL NEEDS IN REACH + ALARM ON)   Transfers   Sit to Stand 5  Supervision  (CGA)   Additional items Assist x 1; Impulsive;Verbal cues   Stand to Sit 5  Supervision  (CGA)   Additional items Assist x 1; Impulsive;Verbal cues   Functional Mobility   Functional Mobility 5  Supervision  (CGA)   Balance   Static Sitting Fair -   Static Standing Poor +   Ambulatory Poor +   Activity Tolerance   Activity Tolerance Patient tolerated treatment well   Medical Staff Made Aware PT SEEN FOR CO-SESSION WITH SKILLED PHYSICAL THERAPIST 2' CLINICALLY UNSTABLE PRESENTATION, TRAUMATIC INJURIES, NEW PRECAUTIONS/LIMITATIONS, LIMITED ACTIVITY TOLERANCE AND PRESENT IMPAIRMENTS WHICH ARE A REGRESSION FROM THE PT'S BASELINE AND IMPACTING OVERALL OCCUPATIONAL PERFORMANCE. Nurse Made Aware APPROPRIATE TO SEE   RUE Assessment   RUE Assessment WFL   LUE Assessment   LUE Assessment WFL   Cognition   Overall Cognitive Status Impaired   Arousal/Participation Alert; Cooperative   Attention Within functional limits   Orientation Level Oriented X4   Memory Decreased recall of precautions   Following Commands Follows one step commands without difficulty   Comments PT IS PLEASANT AND COOPERATIVE. IMPULSIVE WITH LIMITED INSIGHT/JUDGEMENT/SAFETY AWARENESS. ALARM ON FOR SAFETY   Assessment   Limitation Decreased ADL status; Decreased Safe judgement during ADL;Decreased cognition;Decreased endurance;Decreased self-care trans;Decreased high-level ADLs   Prognosis Good   Assessment 79 YO Male SEEN FOR INITIAL OCCUPATIONAL THERAPY EVALUATION FOLLOWING ADMISSION TO [facility] S/P FALL RESULTING IN INTRACRANIAL EPIDURAL HEMATOMA. PROBLEMS LIST/PMH INCLUDES BPH with obstruction/lower urinary tract symptoms and Combined arterial insufficiency and corporo-venous occlusive erectile dysfunction. PT IS FROM HOME ALONE WHERE HE REPORTS BEING INDEPENDENT WITH ADLS/IADLS/DRIVING PTA. PT CURRENTLY REQUIRES OVERALL SUPERVISION-MIN A WITH ADLS, TRANSFERS AND FUNCTIONAL MOBILITY WITHOUT USE OF AD. PT IS LIMITED 2' FATIGUE, IMPAIRED BALANCE, FALL RISK , LIMITED SAFETY AWARENESS/INSIGHT/JUDGEMENT, IMPULSIVE, LIMITED FAMILY/FRIEND SUPPORT , and OVERALL LIMITED ACTIVITY TOLERANCE. PT EDUCATED ON DEEP BREATHING TECHNIQUES T/O ACTIVITY, SLOWING OF PACE, ENERGY CONSERVATION TECHNIQUES FOR CARRY OVER UPON D/C, INCREASED FAMILY SUPPORT, and CONTINUE PARTICIPATION IN SELF-CARE/MOBILITY WITH STAFF [facility] My [facility] . The patient's raw score on the AM-PAC Daily Activity Inpatient Short Form is 19. A raw score of greater than or equal to 19 suggests the patient may benefit from discharge to home. Please refer to the recommendation of the Occupational Therapist for safe discharge planning. FROM AN OCCUPATIONAL THERAPY PERSPECTIVE, PT CAN RETURN HOME WITH INCREASED FAMILY SUPPORT + HOME OT SERVICES UPON D/C. WILL CONT TO FOLLOW TO ADDRESS THE BELOW DESCRIBED GOALS. Goals   Patient Goals TO RETURN HOME   LTG Time Frame 10-14   Long Term Goal #1 SEE BELOW   Plan   Treatment Interventions ADL retraining;Functional transfer training; Endurance training;Cognitive reorientation;Patient/family training;Equipment evaluation/education; Compensatory technique education; Energy conservation; Activityengagement   Goal Expiration Date 11/04/23   OT Frequency 3-5x/wk   Discharge Recommendation   OT Discharge Recommendation Home with home health rehabilitation   Equipment Recommended Shower/Tub chair with back ($)   AM-PAC Daily Activity Inpatient   Lower Body Dressing 3   Bathing 3   Toileting 3   Upper Body Dressing 3 Grooming 3   Eating 4   Daily Activity Raw Score 19   Daily Activity Standardized Score (Calc for Raw Score >=11) 40.22   AM-PAC Applied Cognition Inpatient   Following a Speech/Presentation 3   Understanding Ordinary Conversation 4   Taking Medications 4   Remembering Where Things Are Placed or Put Away 4   Remembering List of 4-5 Errands 3   Taking Care of Complicated Tasks 3   Applied Cognition Raw Score 21   Applied Cognition Standardized Score 44.3       Documentation completed by COOKIE Medina, OTR/L  [address] [place] [address] Certified ID# [id number]

## 2023-10-21 NOTE — DISCHARGE SUMMARY
[address] [facility]  Discharge- Farhana Coburn 1935, 80 y.o. male MRN: [id number]  Unit/Bed#: [facility] 174-71 Encounter: [id number]  Primary Care Provider: Veronica Martel MD   Date and time admitted to hospital: 10/20/2023 10:34 PM    Fall  Assessment & Plan  Mechanical fall  Walks 2 miles a day    * Intracranial epidural hematoma ([address])  Assessment & Plan  COnsult to Neurosurgery 0 appreciate recs  GCS remains 15  HOT protocol  Downgraded to med/surg  Neuro check q 4 hours  Cleared by NS for home  Will d/c on Keppra 500mg bid for 1 week      PE:  OOB in a chair  Pleaseant and conversant  RRR, no complaints of chest pain  Lungs CTA bilaterally, no shortness of breath  Abdomen soft with bowel sounds present  Ambulating to bathroom  Skin warm and dry  Tolerating a diet  No complaints of dizziness or headache  GCS remains a 15        Medical Problems       Resolved Problems  Date Reviewed: 10/21/2023   None         Admission Date:   Admission Orders (From admission, onward)       Ordered        10/20/23 2318  Inpatient Admission  Once                            Admitting Diagnosis: Fall, initial encounter [W19. XXXA]  Unspecified multiple injuries, initial encounter [T07. XXXA]  Intracranial epidural hematoma ([address]) [S98.5Q7E]    HPI: per resident CINDY Nichols:  "Farhana Coburn is a 80 y.o. male who presents as a trauma tx from [address] [place] [address]. Patient presented earlier today after he fell while going up his stairs. He reports he was on the bottom step and went to turn around causing him to lose his balance. He fell on his buttock then hit his head. No LOC. No AC/AP. Immediately was able to get up and go into the house. Denies any chest pain or sob. No syncope."    Procedures Performed: No orders of the defined types were placed in this encounter. Summary of Hospital Course: 79 y/o male transferred from [facility] after a mechanical fall while going up his steps.   He said he fell on his buttock and then hit his head. No LOC, No AC/AP. Doing well here. States he walks 2 miles a day, very active. Discussed with Neurosurgery and they claeared him for discharge with 1 month follow up with an MRI. Will F/U with PCP and with Neurosurgery. No trauma follow up required at this time. Significant Findings, Care, Treatment and Services Provided: CT head wo contrast    Result Date: 10/21/2023  Impression: Stable CT of the brain with isodense to slightly hyperdense extra-axial lesion in the paramedian right parietal vertex with differential considerations of recent extra-axial hemorrhage versus meningioma. Definitive characterization with gadolinium-enhanced MRI of the brain is recommended. 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 Workstation performed: [id number]     CT head without contrast    Addendum Date: 10/20/2023    ADDENDUM: Further evaluation with repeat CT study in 4-6 hours or characterization with MRI is recommended. I personally discussed this study with Lexx Aburto on 10/20/2023 6:10 PM.     Result Date: 10/20/2023  Impression: 1. A 3.4 x 1.9 cm extra-axial heterogeneous hyperdensity along the right occipital lobe, likely representing an extra-axial (epidural) hematoma particularly given the adjacent scalp soft tissue swelling. However, given its heterogeneous appearance this may also represent an extra-axial mass such as a meningioma. 2. A 0.7 cm hyperdense structure with peripheral calcification in the right medial temporal lobe, possibly representing a cavernoma or calcified aneurysm. I personally discussed this study with Lexx Aburto on 10/20/2023 6:10 PM. Workstation performed: [id number]     CT cervical spine without contrast    Result Date: 10/20/2023  Impression: No cervical spine fracture or traumatic malalignment.  I personally discussed this study with Lexx Aburto on 10/20/2023 6:10 PM. Workstation performed: [id number]         Complications: none    Condition at Discharge: stable         Discharge instructions/Information to patient and family:   See after visit summary for information provided to patient and family. Provisions for Follow-Up Care:  See after visit summary for information related to follow-up care and any pertinent home health orders. PCP: Tae Julien MD    Disposition: Home    Planned Readmission: No    Discharge Statement   I spent 30 minutes discharging the patient. This time was spent on the day of discharge. I had direct contact with the patient on the day of discharge. Additional documentation is required if more than 30 minutes were spent on discharge. Discharge Medications:  See after visit summary for reconciled discharge medications provided to patient and family.

## 2023-10-21 NOTE — H&P
H&P - Trauma   Ayan Sepulveda 80 y.o. male MRN: [id number]  Unit/Bed#: ED 27 Encounter: [id number]    Trauma Alert: Evaluation; trauma team arrived at 0    Model of Arrival: Transfer [facility]     Trauma Team: Attending Jovany Sweeney and Residents [address]  Consultants: Neurosurgery     Assessment/Plan   Active Problems / Assessment:   -Possible epidural hematoma vs. Meningioma  -Fall     Plan:   -Admission trauma SD2/HOT protocol  -Q1h neuro checks, repeat head CT in the AM  -Keppra 500mg BID  -Neurosurgery c/s  -PT/OT, geriatrics c/s    History of Present Illness     Chief Complaint: Fall  Mechanism:Fall     HPI:    Ayan Sepulveda is a 80 y.o. male who presents as a trauma tx from [address] [place] [address] Patient presented earlier today after he fell while going up his stairs. He reports he was on the bottom step and went to turn around causing him to lose his balance. He fell on his buttock then hit his head. No LOC. No AC/AP. Immediately was able to get up and go into the house. Denies any chest pain or sob. No syncope. Review of Systems   Constitutional:  Negative for chills and fever. HENT: Negative. Respiratory:  Negative for shortness of breath. Cardiovascular:  Negative for chest pain. Gastrointestinal: Negative. Endocrine: Negative. Genitourinary: Negative. Musculoskeletal: Negative. Skin:  Positive for wound. Allergic/Immunologic: Negative. Neurological:  Negative for seizures, syncope, weakness, numbness and headaches. Hematological: Negative. Psychiatric/Behavioral: Negative. 12-point, complete review of systems was reviewed and negative except as stated above. Historical Information     Past Medical History:   Diagnosis Date    BPH with obstruction/lower urinary tract symptoms     Combined arterial insufficiency and corporo-venous occlusive erectile dysfunction      History reviewed. No pertinent surgical history.      Social History     Tobacco Use    Smoking status: Former Smokeless tobacco: Never   Substance Use Topics    Alcohol use: No    Drug use: No     Immunization History   Administered Date(s) Administered    COVID-19 PFIZER VACCINE 0.3 ML IM 02/25/2021, 03/18/2021    Pneumococcal Conjugate 13-Valent 01/19/2016    Pneumococcal Polysaccharide PPV23 10/09/2001, 11/09/2012    Tdap 12/10/2020     Last Tetanus:   Family History: Non-contributory    1. Before the illness or injury that brought you to the Emergency, did you need someone to help you on a regular basis? 0=No   2. Since the illness or injury that brought you to the Emergency, have you needed more help than usual to take care of yourself? 0=No   3. Have you been hospitalized for one or more nights during the past 6 months (excluding a stay in the Emergency Department)? 0=No   4. In general, do you see well? 0=Yes   5. In general, do you have serious problems with your memory? 0=No   6. Do you take more than three different medications everyday?  1=Yes   TOTAL   1     Did you order a geriatric consult if the score was 2 or greater?: yes     Meds/Allergies   all current active meds have been reviewed   No Known Allergies    Objective   Initial Vitals:   Pulse: 84 (10/20/23 2239)  Respirations: 18 (10/20/23 2239)  Blood Pressure: (!) 196/91 (10/20/23 2239)    Primary Survey:   Airway:        Status: patent;        Pre-hospital Interventions: none        Hospital Interventions: none  Breathing:        Pre-hospital Interventions: none       Effort: normal       Right breath sounds: normal       Left breath sounds: normal  Circulation:        Rhythm: regular       Rate: regular   Right Pulses Left Pulses    R radial: 2+  R femoral: 2+    R carotid: 2+   L radial: 2+  L femoral: 2+    L carotid: 2+     Disability:        GCS: Eye: 4; Verbal: 5 Motor: 6 Total: 15       Right Pupil: round;  reactive         Left Pupil:  round;  reactive      R Motor Strength L Motor Strength    R : 5/5  R dorsiflex: 5/5  R plantarflex: 5/5 L : 5/5  L dorsiflex: 5/5  L plantarflex: 5/5        Sensory:  No sensory deficit  Exposure:       Completed: Yes      Secondary Survey:  Physical Exam  Vitals and nursing note reviewed. Constitutional:       General: He is not in acute distress. Appearance: He is not ill-appearing. HENT:      Head: Normocephalic. Comments: Abrasion noted on R posterior scalp. No obvious laceration       Right Ear: External ear normal.      Left Ear: External ear normal.      Nose: Nose normal.      Mouth/Throat:      Mouth: Mucous membranes are moist.   Eyes:      Extraocular Movements: Extraocular movements intact. Conjunctiva/sclera: Conjunctivae normal.      Pupils: Pupils are equal, round, and reactive to light. Cardiovascular:      Rate and Rhythm: Normal rate. Pulses: Normal pulses. Pulmonary:      Effort: Pulmonary effort is normal.   Abdominal:      General: There is no distension. Palpations: Abdomen is soft. Tenderness: There is no abdominal tenderness. Musculoskeletal:         General: No tenderness. Cervical back: Normal range of motion and neck supple. No rigidity or tenderness. Right lower leg: No edema. Left lower leg: No edema. Comments: No c, t, or l spine tenderness   Skin:     General: Skin is warm. Capillary Refill: Capillary refill takes less than 2 seconds. Neurological:      General: No focal deficit present. Mental Status: He is alert and oriented to person, place, and time. Cranial Nerves: No cranial nerve deficit. Sensory: No sensory deficit. Motor: No weakness. Comments: Sensation to light touch intact x4 extremities     Psychiatric:         Mood and Affect: Mood normal.         Thought Content:  Thought content normal.         Invasive Devices       Peripheral Intravenous Line  Duration             Peripheral IV 10/20/23 Left Antecubital <1 day                  Lab Results: I have personally reviewed all pertinent laboratory/test results 10/20/23 and in the preceding 24 hours. Recent Labs     10/20/23  1835   WBC 15.64*   HGB 13.3   HCT 38.8      BANDSPCT 1   SODIUM 136   K 4.1      CO2 27   BUN 25   CREATININE 0.87   GLUC 187*   AST 19   ALT 28   ALB 4.6   TBILI 0.67   ALKPHOS 65   PTT 30   INR 0.97       Imaging Results: I have personally reviewed pertinent images saved in PACS. CT scan findings (and other pertinent positive findings on images) were discussed with radiology.  My interpretation of the images/reports are as follows:  Chest Xray(s): N/A   FAST exam(s): N/A   CT Scan(s): positive for acute findings: Psb epidural hematoma vs. Meningioma    Additional Xray(s): N/A     Other Studies:     Code Status: Level 1 - Full Code  Advance Directive and Living Will:      Power of :    POLST:

## 2023-10-21 NOTE — DISCHARGE INSTR - AVS FIRST PAGE
No driving for one week    Call and discuss recent hospitalization with you PCP on Monday, Oct 23, 2023    Call with questions or concerns to Trauma - [phone]

## 2023-10-21 NOTE — ASSESSMENT & PLAN NOTE
COnsult to Neurosurgery 0 appreciate recs  GCS remains 15  HOT protocol  Downgraded to med/surg  Neuro check q 4 hours  Cleared by NS for home  Will d/c on Keppra 500mg bid for 1 week

## 2023-10-21 NOTE — ASSESSMENT & PLAN NOTE
Patient presented s/p fall on the steps at home  Patient was found to have extra-axial lesion suspicious for hematoma vs meningioma  +headstrike, -LOC, No AC/AP medication     Imaging:   CT head 10/20/2023: Stable CT of the brain with isodense to slightly hyperdense extra-axial lesion in the paramedian right parietal vertex with differentials considering extra-axial hemorrhage versus meningioma. 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 Plan:   ongoing frequent neurological checks. Recommend STAT CT head for decline in GCS >2 points in 1 hour. Keppra 500mg BID for seizure ppx per trauma team   DVT ppx: SCD's  Hold all AC/AP medication at this time  No reported thinners at home   PT/OT evaluation. Ongoing medical management and pain control per primary team  CM following for dispo planning  Will order MRI brain in 1 month for follow up regarding extra-axial lesion to determine possible meningioma vs hematoma. Do not suspect patient will require any surgical intervention   Neurosurgery will sign off. Plan for outpatient follow up. Call with questions or concerns.

## 2023-10-21 NOTE — PLAN OF CARE
Problem: MOBILITY - ADULT  Goal: Maintain or return to baseline ADL function  Description: INTERVENTIONS:  -  Assess patient's ability to carry out ADLs; assess patient's baseline for ADL function and identify physical deficits which impact ability to perform ADLs (bathing, care of mouth/teeth, toileting, grooming, dressing, etc.)  - Assess/evaluate cause of self-care deficits   - Assess range of motion  - Assess patient's mobility; develop plan if impaired  - Assess patient's need for assistive devices and provide as appropriate  - Encourage maximum independence but intervene and supervise when necessary  - Involve family in performance of ADLs  - Assess for home care needs following discharge   - Consider OT consult to assist with ADL evaluation and planning for discharge  - Provide patient education as appropriate  Outcome: Progressing  Goal: Maintains/Returns to pre admission functional level  Description: INTERVENTIONS:  - Perform BMAT or MOVE assessment daily.   - Set and communicate daily mobility goal to care team and patient/family/caregiver. - Collaborate with rehabilitation services on mobility goals if consulted  - Perform Range of Motion  times a day. - Reposition patient every  hours.   - Dangle patient  times a day  - Stand patient  times a day  - Ambulate patient  times a day  - Out of bed to chair  times a day   - Out of bed for meals  times a day  - Out of bed for toileting  - Record patient progress and toleration of activity level   Outcome: Progressing     Problem: PAIN - ADULT  Goal: Verbalizes/displays adequate comfort level or baseline comfort level  Description: Interventions:  - Encourage patient to monitor pain and request assistance  - Assess pain using appropriate pain scale  - Administer analgesics based on type and severity of pain and evaluate response  - Implement non-pharmacological measures as appropriate and evaluate response  - Consider cultural and social influences on pain and pain management  - Notify physician/advanced practitioner if interventions unsuccessful or patient reports new pain  Outcome: Progressing     Problem: INFECTION - ADULT  Goal: Absence or prevention of progression during hospitalization  Description: INTERVENTIONS:  - Assess and monitor for signs and symptoms of infection  - Monitor lab/diagnostic results  - Monitor all insertion sites, i.e. indwelling lines, tubes, and drains  - Monitor endotracheal if appropriate and nasal secretions for changes in amount and color  - [place] appropriate cooling/warming therapies per order  - Administer medications as ordered  - Instruct and encourage patient and family to use good hand hygiene technique  - Identify and instruct in appropriate isolation precautions for identified infection/condition  Outcome: Progressing  Goal: Absence of fever/infection during neutropenic period  Description: INTERVENTIONS:  - Monitor WBC    Outcome: Progressing     Problem: SAFETY ADULT  Goal: Maintain or return to baseline ADL function  Description: INTERVENTIONS:  -  Assess patient's ability to carry out ADLs; assess patient's baseline for ADL function and identify physical deficits which impact ability to perform ADLs (bathing, care of mouth/teeth, toileting, grooming, dressing, etc.)  - Assess/evaluate cause of self-care deficits   - Assess range of motion  - Assess patient's mobility; develop plan if impaired  - Assess patient's need for assistive devices and provide as appropriate  - Encourage maximum independence but intervene and supervise when necessary  - Involve family in performance of ADLs  - Assess for home care needs following discharge   - Consider OT consult to assist with ADL evaluation and planning for discharge  - Provide patient education as appropriate  Outcome: Progressing  Goal: Maintains/Returns to pre admission functional level  Description: INTERVENTIONS:  - Perform BMAT or MOVE assessment daily.   - Set and communicate daily mobility goal to care team and patient/family/caregiver. - Collaborate with rehabilitation services on mobility goals if consulted  - Perform Range of Motion  times a day. - Reposition patient every  hours.   - Dangle patient  times a day  - Stand patient  times a day  - Ambulate patient  times a day  - Out of bed to chair  times a day   - Out of bed for meals  times a day  - Out of bed for toileting  - Record patient progress and toleration of activity level   Outcome: Progressing  Goal: Patient will remain free of falls  Description: INTERVENTIONS:  - Educate patient/family on patient safety including physical limitations  - Instruct patient to call for assistance with activity   - Consult OT/PT to assist with strengthening/mobility   - Keep Call bell within reach  - Keep bed low and locked with side rails adjusted as appropriate  - Keep care items and personal belongings within reach  - Initiate and maintain comfort rounds  - Make Fall Risk Sign visible to staff  - Offer Toileting every  Hours, in advance of need  - Initiate/Maintain alarm  - Obtain necessary fall risk management equipment  - Apply yellow socks and bracelet for high fall risk patients  - Consider moving patient to room near nurses station  Outcome: Progressing     Problem: DISCHARGE PLANNING  Goal: Discharge to home or other facility with appropriate resources  Description: INTERVENTIONS:  - Identify barriers to discharge w/patient and caregiver  - Arrange for needed discharge resources and transportation as appropriate  - Identify discharge learning needs (meds, wound care, etc.)  - Arrange for interpretive services to assist at discharge as needed  - Refer to Case Management Department for coordinating discharge planning if the patient needs post-hospital services based on physician/advanced practitioner order or complex needs related to functional status, cognitive ability, or social support system  Outcome: Progressing Problem: Knowledge Deficit  Goal: Patient/family/caregiver demonstrates understanding of disease process, treatment plan, medications, and discharge instructions  Description: Complete learning assessment and assess knowledge base. Interventions:  - Provide teaching at level of understanding  - Provide teaching via preferred learning methods  Outcome: Progressing     Problem: NEUROSENSORY - ADULT  Goal: Achieves stable or improved neurological status  Description: INTERVENTIONS  - Monitor and report changes in neurological status  - Monitor vital signs such as temperature, blood pressure, glucose, and any other labs ordered   - Initiate measures to prevent increased intracranial pressure  - Monitor for seizure activity and implement precautions if appropriate      Outcome: Progressing  Goal: Remains free of injury related to seizures activity  Description: INTERVENTIONS  - Maintain airway, patient safety  and administer oxygen as ordered  - Monitor patient for seizure activity, document and report duration and description of seizure to physician/advanced practitioner  - If seizure occurs,  ensure patient safety during seizure  - Reorient patient post seizure  - Seizure pads on all 4 side rails  - Instruct patient/family to notify RN of any seizure activity including if an aura is experienced  - Instruct patient/family to call for assistance with activity based on nursing assessment  - Administer anti-seizure medications if ordered    Outcome: Progressing  Goal: Achieves maximal functionality and self care  Description: INTERVENTIONS  - Monitor swallowing and airway patency with patient fatigue and changes in neurological status  - Encourage and assist patient to increase activity and self care.    - Encourage visually impaired, hearing impaired and aphasic patients to use assistive/communication devices  Outcome: Progressing     Problem: MUSCULOSKELETAL - ADULT  Goal: Maintain or return mobility to safest level of function  Description: INTERVENTIONS:  - Assess patient's ability to carry out ADLs; assess patient's baseline for ADL function and identify physical deficits which impact ability to perform ADLs (bathing, care of mouth/teeth, toileting, grooming, dressing, etc.)  - Assess/evaluate cause of self-care deficits   - Assess range of motion  - Assess patient's mobility  - Assess patient's need for assistive devices and provide as appropriate  - Encourage maximum independence but intervene and supervise when necessary  - Involve family in performance of ADLs  - Assess for home care needs following discharge   - Consider OT consult to assist with ADL evaluation and planning for discharge  - Provide patient education as appropriate  Outcome: Progressing  Goal: Maintain proper alignment of affected body part  Description: INTERVENTIONS:  - Support, maintain and protect limb and body alignment  - Provide patient/ family with appropriate education  Outcome: Progressing

## 2023-10-23 ENCOUNTER — TELEPHONE (OUTPATIENT)
Dept: NEUROSURGERY | Facility: CLINIC | Age: 88
End: 2023-10-23

## 2023-10-23 NOTE — TELEPHONE ENCOUNTER
10/23/2023-CALLED PT, LEFT MESSAGE ON MACHINE CONFIRMING 11/24/2023 APT IN [place] AND TO SCHEDULE MRI BRAIN PRIOR TO APT.    **WAITING FOR MRI BRAIN TO BE SCHEDULED**        10/21/2023-Marko Andrews PA-C   1 month follow up with AP solo.  MRI brain

## 2023-11-13 ENCOUNTER — HOSPITAL ENCOUNTER (OUTPATIENT)
Dept: MRI IMAGING | Facility: HOSPITAL | Age: 88
Discharge: HOME | End: 2023-11-13
Payer: MEDICARE

## 2023-11-13 DIAGNOSIS — S06.4X0A INTRACRANIAL EPIDURAL HEMATOMA (HCC): ICD-10-CM

## 2023-11-13 PROCEDURE — 70553 MRI BRAIN STEM W/O & W/DYE: CPT

## 2023-11-13 PROCEDURE — A9585 GADOBUTROL INJECTION: HCPCS | Performed by: PHYSICIAN ASSISTANT

## 2023-11-13 PROCEDURE — G1004 CDSM NDSC: HCPCS

## 2023-11-13 RX ORDER — GADOBUTROL 604.72 MG/ML
8 INJECTION INTRAVENOUS
Status: COMPLETED | OUTPATIENT
Start: 2023-11-13 | End: 2023-11-13

## 2023-11-13 RX ADMIN — GADOBUTROL 8 ML: 604.72 INJECTION INTRAVENOUS at 16:00

## 2023-11-22 ENCOUNTER — TELEPHONE (OUTPATIENT)
Dept: NEUROSURGERY | Facility: CLINIC | Age: 88
End: 2023-11-22

## 2023-11-22 NOTE — TELEPHONE ENCOUNTER
Pt called to reschedule, his new appt is 12/4/23 in [facility] at [address]

## 2023-12-04 ENCOUNTER — OFFICE VISIT (OUTPATIENT)
Dept: NEUROSURGERY | Facility: CLINIC | Age: 88
End: 2023-12-04
Payer: MEDICARE

## 2023-12-04 VITALS
HEIGHT: 67 IN | TEMPERATURE: 97.9 F | WEIGHT: 185 LBS | RESPIRATION RATE: 16 BRPM | BODY MASS INDEX: 29.03 KG/M2 | SYSTOLIC BLOOD PRESSURE: 166 MMHG | HEART RATE: 80 BPM | DIASTOLIC BLOOD PRESSURE: 62 MMHG | OXYGEN SATURATION: 94 %

## 2023-12-04 DIAGNOSIS — R90.89 ABNORMAL FINDING ON MRI OF BRAIN: Primary | ICD-10-CM

## 2023-12-04 PROCEDURE — 99214 OFFICE O/P EST MOD 30 MIN: CPT | Performed by: NURSE PRACTITIONER

## 2023-12-04 NOTE — PROGRESS NOTES
Neurosurgery Office Note  Hedy Linares 80 y.o. male MRN: [id number]      Assessment/Plan     Abnormal finding on MRI of brain  Patient seen outpatient office today for approximately 1 month post hospital visit with MRI brain  Patient was recent hospitalized in October status post mechanical fall with head strike on no AP/AC medication. Imaging demonstrated intracranial epidural hematoma versus meningioma. He offers no complaints. 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 Imaging: My brain with and without contrast 11/13/2023:Right paramedian posterior parietal meningioma measuring 2.8 x 1.7 x 2.9 cm correlates with extra-axial lesion identified on CT. 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 Plan:  Reviewed imaging with patient and daughter in detail and compared to prior CT scan. No neurosurgical intervention warranted at this time. Reviewed natural history of meningiomas with patient and daughter, discussed possible treatment options including radiation, surgical resection, or surveillance with regular MRIs and follow-ups. Recommend per NCCN guidelines follow-up in 3 months with repeat MRI brain with and without contrast.  Both patient and daughter state they would not like any further work-up or follow-up for this. Patient will follow-up as needed or if symptoms worsen  Patient made aware to seek care sooner if he develops any new or worsening neurological change reflexes  Patient made aware to contact neurosurgery with any further questions or concerns. There are no diagnoses linked to this encounter.       I have spent a total time of 30 minutes on 12/04/23 in caring for this patient including Diagnostic results, Risks and benefits of tx options, Instructions for management, Patient and family education, Importance of tx compliance, Risk factor reductions, Impressions, Counseling / Coordination of care, Documenting in the medical record, Reviewing / ordering tests, medicine, procedures  , and Obtaining or reviewing history  . CHIEF COMPLAINT    Chief Complaint   Patient presents with    Follow-up       HISTORY    History of Present Illness     80y.o. year old male with past medical history significant for type 2 diabetes, hypertension, melanoma, hyperlipidemia, cataracts, and choroidal malignant melanoma. Patient seen in outpatient office today for approximately 1 month post hospital follow-up with MRI brain. Patient was recently hospitalized in October status post mechanical fall with positive head strike, no LOC and no AP/AC medication when intracranial epidural hematoma versus meningioma was seen on imaging. Patient had MRI 1 month later and is seen in follow-up today. Patient offers no complaints. He denies any headaches, dizziness, blurry vision, chest pain, shortness of breath, abdominal pain, nausea, vomiting, diarrhea, no problems with bowel or bladder, no new weakness or numbness/tingling. Patient denies any blood thinner use. He does endorse left eye melanoma status post surgery in 2004 followed by radiation. No recent falls or traumas. Did discuss per NCCN guidelines recommend repeat MRI in 3 months. Had long discussion with patient and daughter who both state they would not like any further follow-up or imaging. Patient made aware to contact the office as needed. Also discussed stable right medial temporal lobe cavernoma. HPI    See Discussion    REVIEW OF SYSTEMS    Review of Systems   Constitutional: Negative. HENT:  Negative for hearing loss (bi/hearing aids). Eyes: Negative. Respiratory: Negative. Cardiovascular: Negative. Gastrointestinal: Negative. Endocrine: Negative. Genitourinary: Negative. Musculoskeletal: Negative. Allergic/Immunologic: Negative. Neurological: Negative. Hematological: Negative. Psychiatric/Behavioral: Negative.          ROS reviewed with patient and agree and changes were made as needed    Meds/Allergies     Current Outpatient Medications   Medication Sig Dispense Refill    amLODIPine-benazepril (LOTREL 5-20) 5-20 MG per capsule Take 1 capsule by mouth daily      metFORMIN (GLUCOPHAGE) 500 mg tablet Take 1 tablet by mouth Twice daily      simvastatin (ZOCOR) 40 mg tablet       acetaminophen (TYLENOL) 325 mg tablet Take 2 tablets (650 mg total) by mouth every 6 (six) hours as needed for mild pain, headaches or fever (Patient not taking: Reported on 12/4/2023) 60 tablet 0    alprostadil (MUSE) 1000 MCG pellet 1 each (1,000 mcg total) by Transurethral route as needed for erectile dysfunction use no more than 3 times per week. 3 boxes of 6 pllt 18 each 0    alprostadil (MUSE) 1000 MCG pellet 1 each (1,000 mcg total) by Transurethral route as needed for erectile dysfunction use no more than 3 times per week. 3 boxes of 6 pllt 18 each 0    chlorthalidone 25 mg tablet Take 25 mg by mouth daily      levETIRAcetam (KEPPRA) 500 mg tablet Take 1 tablet (500 mg total) by mouth 2 (two) times a day (Patient not taking: Reported on 12/4/2023) 14 tablet 0     No current facility-administered medications for this visit. No Known Allergies    PAST HISTORY    Past Medical History:   Diagnosis Date    BPH with obstruction/lower urinary tract symptoms     Combined arterial insufficiency and corporo-venous occlusive erectile dysfunction        No past surgical history on file. Social History     Tobacco Use    Smoking status: Former    Smokeless tobacco: Never   Substance Use Topics    Alcohol use: No    Drug use: No       Family History   Problem Relation Age of Onset    Hypertension Family          Above history personally reviewed. EXAM    Vitals:Blood pressure 166/62, pulse 80, temperature 97.9 °F (36.6 °C), temperature source Temporal, resp. rate 16, height 5' 7" (1.702 m), weight 83.9 kg (185 lb), SpO2 94 %. ,Body mass index is 28.98 kg/m². Physical Exam  Vitals reviewed. Constitutional:       General: He is awake. He is not in acute distress. Appearance: Normal appearance. He is not ill-appearing. HENT:      Head: Normocephalic and atraumatic. Eyes:      Extraocular Movements: Extraocular movements intact and EOM normal.      Conjunctiva/sclera: Conjunctivae normal.      Pupils: Pupils are equal, round, and reactive to light. Cardiovascular:      Rate and Rhythm: Normal rate. Pulmonary:      Effort: Pulmonary effort is normal. No respiratory distress. Chest:      Chest wall: No tenderness. Abdominal:      General: There is no distension. Palpations: Abdomen is soft. Tenderness: There is no abdominal tenderness. Musculoskeletal:         General: Normal range of motion. Cervical back: Normal range of motion and neck supple. Skin:     General: Skin is warm and dry. Neurological:      Mental Status: He is alert and oriented to person, place, and time. Motor: Motor strength is normal.     Coordination: Finger-Nose-Finger Test normal.      Deep Tendon Reflexes:      Reflex Scores:       Bicep reflexes are 2+ on the right side and 2+ on the left side. Patellar reflexes are 2+ on the right side and 2+ on the left side. Psychiatric:         Attention and Perception: Attention and perception normal.         Mood and Affect: Mood and affect normal.         Speech: Speech normal.         Behavior: Behavior normal. Behavior is cooperative. Thought Content: Thought content normal.         Cognition and Memory: Cognition and memory normal.         Judgment: Judgment normal.         Neurologic Exam     Mental Status   Oriented to person, place, and time. Follows 2 step commands. Attention: normal. Concentration: normal.   Speech: speech is normal   Level of consciousness: alert  Knowledge: good. Able to perform simple calculations. Able to name object. Normal comprehension.      Cranial Nerves CN III, IV, VI   Pupils are equal, round, and reactive to light. Extraocular motions are normal.   CN III: no CN III palsy  CN VI: no CN VI palsy  Nystagmus: none   Diplopia: none  Conjugate gaze: present    CN V   Facial sensation intact. CN VII   Facial expression full, symmetric. CN VIII   CN VIII normal.   Hearing: intact    CN IX, X   CN IX normal.     CN XI   CN XI normal.     CN XII   CN XII normal.     Motor Exam   Muscle bulk: normal  Overall muscle tone: normal  Right arm pronator drift: absent  Left arm pronator drift: absent    Strength   Strength 5/5 throughout. Sensory Exam   Light touch normal.     Gait, Coordination, and Reflexes     Coordination   Finger to nose coordination: normal    Tremor   Resting tremor: absent  Intention tremor: absent  Action tremor: absent    Reflexes   Right biceps: 2+  Left biceps: 2+  Right patellar: 2+  Left patellar: 2+  Right Boyd: absent  Left Boyd: absent  Right ankle clonus: absent  Left ankle clonus: absent        MEDICAL DECISION MAKING    Imaging Studies:     MRI brain w wo contrast    Result Date: 11/15/2023  Narrative: MRI BRAIN WITH AND WITHOUT CONTRAST INDICATION: Epidural hematoma versus meningioma. . COMPARISON: CT dated 10/21/2023. TECHNIQUE: Multiplanar, multisequence imaging of the brain was performed before and after gadolinium administration. IV Contrast:  8 mL of Gadobutrol injection (SINGLE-DOSE) IMAGE QUALITY:   Diagnostic. FINDINGS: BRAIN PARENCHYMA: There is a paramedian posterior right parietal homogeneously enhancing extra-axial mass with enhancing dural tail measuring 2.8 x 1.7 x 2.9 cm that is most consistent with meningioma. This corresponds with finding on prior CT. Mild localized mass effect. No edema. No invasion or mass effect on the adjacent superior sagittal sinus. Stable 8 mm lesion in the medial right temporal lobe with mean artifact that likely represents a cavernous malformation. No acute hemorrhage or edema. No acute infarct, hemorrhage, intra-axial mass, significant mass effect, shift or herniation. Mild chronic microangiopathy. No other abnormal enhancement. VENTRICLES:  Normal for the patient's age. SELLA AND PITUITARY GLAND:  Normal. ORBITS: Bilateral lens replacement. PARANASAL SINUSES: Small left maxillary sinus retention cyst. VASCULATURE:  Evaluation of the major intracranial vasculature demonstrates appropriate flow voids. CALVARIUM AND SKULL BASE:  Normal. EXTRACRANIAL SOFT TISSUES:  Normal.     Impression: Right paramedian posterior parietal meningioma measuring 2.8 x 1.7 x 2.9 cm correlates with extra-axial lesion identified on CT. 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 Study marked in epic for notification and follow-up. Workstation performed: [id number]       I have personally reviewed pertinent reports.    and I have personally reviewed pertinent films in PACS

## 2023-12-04 NOTE — ASSESSMENT & PLAN NOTE
Patient seen outpatient office today for approximately 1 month post hospital visit with MRI brain  Patient was recent hospitalized in October status post mechanical fall with head strike on no AP/AC medication. Imaging demonstrated intracranial epidural hematoma versus meningioma. He offers no complaints. 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 Imaging: My brain with and without contrast 11/13/2023:Right paramedian posterior parietal meningioma measuring 2.8 x 1.7 x 2.9 cm correlates with extra-axial lesion identified on CT. 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 Plan:  Reviewed imaging with patient and daughter in detail and compared to prior CT scan. No neurosurgical intervention warranted at this time. Reviewed natural history of meningiomas with patient and daughter, discussed possible treatment options including radiation, surgical resection, or surveillance with regular MRIs and follow-ups. Recommend per NCCN guidelines follow-up in 3 months with repeat MRI brain with and without contrast.  Both patient and daughter state they would not like any further work-up or follow-up for this. Patient will follow-up as needed or if symptoms worsen  Patient made aware to seek care sooner if he develops any new or worsening neurological change reflexes  Patient made aware to contact neurosurgery with any further questions or concerns.

## 2023-12-15 ENCOUNTER — TELEPHONE (OUTPATIENT)
Age: 88
End: 2023-12-15

## 2023-12-15 NOTE — TELEPHONE ENCOUNTER
Patient calling in requesting an apt reminder card to be sent in the mail to his home address below.      APT info:    2/23/24 @ 2:00 pm  [address]

## 2024-02-23 ENCOUNTER — OFFICE VISIT (OUTPATIENT)
Dept: UROLOGY | Facility: MEDICAL CENTER | Age: 89
End: 2024-02-23
Payer: MEDICARE

## 2024-02-23 VITALS
WEIGHT: 186 LBS | OXYGEN SATURATION: 98 % | BODY MASS INDEX: 29.19 KG/M2 | HEIGHT: 67 IN | HEART RATE: 78 BPM | SYSTOLIC BLOOD PRESSURE: 146 MMHG | DIASTOLIC BLOOD PRESSURE: 60 MMHG

## 2024-02-23 DIAGNOSIS — N13.8 BPH WITH OBSTRUCTION/LOWER URINARY TRACT SYMPTOMS: Primary | ICD-10-CM

## 2024-02-23 DIAGNOSIS — N40.1 BPH WITH OBSTRUCTION/LOWER URINARY TRACT SYMPTOMS: Primary | ICD-10-CM

## 2024-02-23 DIAGNOSIS — N52.03 COMBINED ARTERIAL INSUFFICIENCY AND CORPORO-VENOUS OCCLUSIVE ERECTILE DYSFUNCTION: ICD-10-CM

## 2024-02-23 DIAGNOSIS — E11.9 TYPE 2 DIABETES MELLITUS WITHOUT COMPLICATION, WITHOUT LONG-TERM CURRENT USE OF INSULIN (HCC): ICD-10-CM

## 2024-02-23 LAB
SL AMB  POCT GLUCOSE, UA: NORMAL
SL AMB LEUKOCYTE ESTERASE,UA: NORMAL
SL AMB POCT BILIRUBIN,UA: NORMAL
SL AMB POCT BLOOD,UA: NORMAL
SL AMB POCT CLARITY,UA: CLEAR
SL AMB POCT COLOR,UA: YELLOW
SL AMB POCT KETONES,UA: NORMAL
SL AMB POCT NITRITE,UA: NORMAL
SL AMB POCT PH,UA: 5
SL AMB POCT SPECIFIC GRAVITY,UA: 1.01
SL AMB POCT URINE PROTEIN: NORMAL
SL AMB POCT UROBILINOGEN: 0.2

## 2024-02-23 PROCEDURE — 81003 URINALYSIS AUTO W/O SCOPE: CPT | Performed by: UROLOGY

## 2024-02-23 PROCEDURE — 99214 OFFICE O/P EST MOD 30 MIN: CPT | Performed by: UROLOGY

## 2024-02-23 RX ORDER — GLYBURIDE-METFORMIN HYDROCHLORIDE 5; 500 MG/1; MG/1
1 TABLET ORAL 2 TIMES DAILY WITH MEALS
COMMUNITY
Start: 2024-01-23

## 2024-02-23 RX ORDER — SILDENAFIL 100 MG/1
100 TABLET, FILM COATED ORAL DAILY PRN
Qty: 10 TABLET | Refills: 1 | Status: SHIPPED | OUTPATIENT
Start: 2024-02-23

## 2024-02-23 NOTE — PROGRESS NOTES
Assessment/Plan:    BPH with obstruction/lower urinary tract symptoms  AUA symptom score is 5.  He is pleased with his voiding pattern.  Urinalysis today is negative and digital rectal examination palpably benign.  Options were discussed and we will continue to follow with watchful waiting.    Corporo-venous occlusive erectile dysfunction  Muse is no longer available.  Options were discussed and the patient would like to try Viagra 100 mg-brand necessary.  Use and side effects were discussed.       Diagnoses and all orders for this visit:    BPH with obstruction/lower urinary tract symptoms  -     POCT urine dip auto non-scope    Combined arterial insufficiency and corporo-venous occlusive erectile dysfunction  -     sildenafil (VIAGRA) 100 mg tablet; Take 1 tablet (100 mg total) by mouth daily as needed for erectile dysfunction    Type 2 diabetes mellitus without complication, without long-term current use of insulin (HCC)    Other orders  -     glyBURIDE-metFORMIN (GLUCOVANCE) 5-500 MG per tablet; Take 1 tablet by mouth 2 (two) times a day with meals  -     metFORMIN (GLUCOPHAGE) 1000 MG tablet; take 1 tablet by mouth once daily with DINNER ALONG WITH GLUCOVANCE          Subjective:      Patient ID: Seth Nicole is a 88 y.o. male.    Benign Prostatic Hypertrophy  This is a chronic problem. The current episode started more than 1 year ago. The problem is unchanged. Irritative symptoms do not include frequency, nocturia or urgency. Obstructive symptoms include a slower stream. Obstructive symptoms do not include dribbling, incomplete emptying, an intermittent stream, straining or a weak stream. Pertinent negatives include no chills, dysuria, hematuria, hesitancy, nausea or vomiting. AUA score is 0-7. His sexual activity is non-contributory to the current illness. Nothing aggravates the symptoms. Past treatments include nothing.   Erectile Dysfunction  This is a chronic problem. The current episode started more than  1 year ago. The problem is unchanged. The nature of his difficulty is achieving erection. He reports no decreased libido. Irritative symptoms do not include frequency, nocturia or urgency. Obstructive symptoms include a slower stream. Obstructive symptoms do not include dribbling, incomplete emptying, an intermittent stream, straining or a weak stream. Pertinent negatives include no chills, dysuria, hematuria or hesitancy. Nothing aggravates the symptoms. Past treatments include injection treatment. He has had no adverse reactions caused by medications.   MUSE is no longer available.     The following portions of the patient's history were reviewed and updated as appropriate: allergies, current medications, past family history, past medical history, past social history, past surgical history and problem list.    Review of Systems   Constitutional:  Negative for chills, diaphoresis, fatigue and fever.   HENT:  Positive for hearing loss.    Eyes: Negative.    Respiratory: Negative.     Cardiovascular: Negative.    Gastrointestinal: Negative.  Negative for nausea and vomiting.   Endocrine: Negative.    Genitourinary:  Negative for decreased libido, dysuria, frequency, hematuria, hesitancy, incomplete emptying, nocturia and urgency.        See HPI   Musculoskeletal: Negative.    Skin: Negative.    Allergic/Immunologic: Negative.    Neurological: Negative.    Hematological: Negative.    Psychiatric/Behavioral: Negative.         AUA SYMPTOM SCORE      Flowsheet Row Most Recent Value   AUA SYMPTOM SCORE    How often have you had a sensation of not emptying your bladder completely after you finished urinating? 1   How often have you had to urinate again less than two hours after you finished urinating? 2   How often have you found you stopped and started again several times when you urinate? 0   How often have you found it difficult to postpone urination? 0   How often have you had a weak urinary stream? 0   How often have  "you had to push or strain to begin urination? 0   How many times did you most typically get up to urinate from the time you went to bed at night until the time you got up in the morning? 2   Quality of Life: If you were to spend the rest of your life with your urinary condition just the way it is now, how would you feel about that? 1   AUA SYMPTOM SCORE 5           Objective:      /60 (BP Location: Left arm, Patient Position: Sitting, Cuff Size: Standard)   Pulse 78   Ht 5' 7\" (1.702 m)   Wt 84.4 kg (186 lb)   SpO2 98%   BMI 29.13 kg/m²          Physical Exam  Vitals reviewed.   Constitutional:       General: He is not in acute distress.     Appearance: Normal appearance. He is well-developed. He is not ill-appearing, toxic-appearing or diaphoretic.   HENT:      Head: Normocephalic and atraumatic.   Eyes:      General: No scleral icterus.     Conjunctiva/sclera: Conjunctivae normal.   Cardiovascular:      Rate and Rhythm: Normal rate.   Pulmonary:      Effort: Pulmonary effort is normal.   Abdominal:      General: Bowel sounds are normal. There is no distension.      Palpations: Abdomen is soft. There is no mass.      Tenderness: There is no abdominal tenderness. There is no right CVA tenderness, left CVA tenderness, guarding or rebound.      Hernia: No hernia is present.   Genitourinary:     Penis: Normal. No phimosis or hypospadias.       Testes: Normal.         Right: Mass not present.         Left: Mass not present.      Rectum: Normal.      Comments: Prostate moderately enlarged and palpably benign  Musculoskeletal:         General: Normal range of motion.      Cervical back: Neck supple.   Skin:     General: Skin is warm and dry.   Neurological:      Mental Status: He is alert and oriented to person, place, and time.   Psychiatric:         Mood and Affect: Mood normal.         Behavior: Behavior normal.         Thought Content: Thought content normal.         Judgment: Judgment normal.         "

## 2024-02-23 NOTE — ASSESSMENT & PLAN NOTE
AUA symptom score is 5.  He is pleased with his voiding pattern.  Urinalysis today is negative and digital rectal examination palpably benign.  Options were discussed and we will continue to follow with watchful waiting.

## 2024-02-23 NOTE — ASSESSMENT & PLAN NOTE
Muse is no longer available.  Options were discussed and the patient would like to try Viagra 100 mg-brand necessary.  Use and side effects were discussed.

## 2024-07-12 ENCOUNTER — HOSPITAL ENCOUNTER (EMERGENCY)
Facility: HOSPITAL | Age: 89
Discharge: HOME | End: 2024-07-12
Attending: EMERGENCY MEDICINE
Payer: MEDICARE

## 2024-07-12 ENCOUNTER — APPOINTMENT (EMERGENCY)
Dept: CT IMAGING | Facility: HOSPITAL | Age: 89
End: 2024-07-12
Payer: MEDICARE

## 2024-07-12 VITALS
TEMPERATURE: 97.8 F | OXYGEN SATURATION: 97 % | SYSTOLIC BLOOD PRESSURE: 175 MMHG | DIASTOLIC BLOOD PRESSURE: 98 MMHG | RESPIRATION RATE: 18 BRPM | HEART RATE: 75 BPM
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
## 2024-07-12 DIAGNOSIS — S09.90XA INJURY OF HEAD, INITIAL ENCOUNTER: ICD-10-CM

## 2024-07-12 PROCEDURE — 72125 CT NECK SPINE W/O DYE: CPT

## 2024-07-12 PROCEDURE — 70450 CT HEAD/BRAIN W/O DYE: CPT

## 2024-07-12 PROCEDURE — 99284 EMERGENCY DEPT VISIT MOD MDM: CPT

## 2024-07-12 PROCEDURE — 99284 EMERGENCY DEPT VISIT MOD MDM: CPT | Performed by: EMERGENCY MEDICINE

## 2024-07-12 PROCEDURE — 12001 RPR S/N/AX/GEN/TRNK 2.5CM/<: CPT | Performed by: EMERGENCY MEDICINE

## 2024-07-12 RX ORDER — LIDOCAINE HCL/EPINEPHRINE/PF 2%-1:200K
10 VIAL (ML) INJECTION ONCE
Status: COMPLETED | OUTPATIENT
Start: 2024-07-12 | End: 2024-07-12

## 2024-07-12 RX ADMIN — LIDOCAINE HYDROCHLORIDE,EPINEPHRINE BITARTRATE 10 ML: 20; .005 INJECTION, SOLUTION EPIDURAL; INFILTRATION; INTRACAUDAL; PERINEURAL at 15:06

## 2024-07-12 NOTE — DISCHARGE INSTRUCTIONS
You can shower, your head can get wet. Do not scrub over the area and let it air dry.     Return to the ER in 7 days to have the 2 sutures removed.

## 2024-07-12 NOTE — ED PROVIDER NOTES
History  Chief Complaint   Patient presents with    Fall     Pt arrived via EMS. Pt tripped backwards landing on butt and hit head on pavement. Bleeding controlled on head laceration. No thinner, LOC, dizziness, nausea.     89 YO male presents after a mechanical fall. Patient states he was outside when he fell backward, landed on his buttocks and then struck his occipital head on the gravel ground. He did not lose consciousness, was able to ambulate after the fall. He does not take anticoagulation. He has pain over the head but no other complaints. He denies chest pain, shortness of breath, lightheadedness, palpitations prior to the fall. Pt denies CP/SOB/F/C/N/V/D/C, no dysuria, burning on urination or blood in urine.       History provided by:  Patient   used: No        Prior to Admission Medications   Prescriptions Last Dose Informant Patient Reported? Taking?   acetaminophen (TYLENOL) 325 mg tablet   No No   Sig: Take 2 tablets (650 mg total) by mouth every 6 (six) hours as needed for mild pain, headaches or fever   Patient not taking: Reported on 12/4/2023   amLODIPine-benazepril (LOTREL 5-20) 5-20 MG per capsule   Yes No   Sig: Take 1 capsule by mouth daily   chlorthalidone 25 mg tablet   Yes No   Sig: Take 25 mg by mouth daily   glyBURIDE-metFORMIN (GLUCOVANCE) 5-500 MG per tablet   Yes No   Sig: Take 1 tablet by mouth 2 (two) times a day with meals   levETIRAcetam (KEPPRA) 500 mg tablet   No No   Sig: Take 1 tablet (500 mg total) by mouth 2 (two) times a day   Patient not taking: Reported on 12/4/2023   metFORMIN (GLUCOPHAGE) 1000 MG tablet   Yes No   Sig: take 1 tablet by mouth once daily with DINNER ALONG WITH GLUCOVANCE   metFORMIN (GLUCOPHAGE) 500 mg tablet   Yes No   Sig: Take 1 tablet by mouth Twice daily   Patient not taking: Reported on 2/23/2024   sildenafil (VIAGRA) 100 mg tablet   No No   Sig: Take 1 tablet (100 mg total) by mouth daily as needed for erectile dysfunction    simvastatin (ZOCOR) 40 mg tablet   Yes No      Facility-Administered Medications: None       Past Medical History:   Diagnosis Date    BPH with obstruction/lower urinary tract symptoms     Combined arterial insufficiency and corporo-venous occlusive erectile dysfunction     Diabetes (HCC)        History reviewed. No pertinent surgical history.    Family History   Problem Relation Age of Onset    Hypertension Family      I have reviewed and agree with the history as documented.    E-Cigarette/Vaping     E-Cigarette/Vaping Substances     Social History     Tobacco Use    Smoking status: Former    Smokeless tobacco: Never   Substance Use Topics    Alcohol use: No    Drug use: No       Review of Systems   Constitutional:  Negative for fever.   HENT:  Negative for dental problem.    Eyes:  Negative for visual disturbance.   Respiratory:  Negative for shortness of breath.    Cardiovascular:  Negative for chest pain.   Gastrointestinal:  Negative for abdominal pain, nausea and vomiting.   Genitourinary:  Negative for dysuria and frequency.   Musculoskeletal:  Negative for neck pain and neck stiffness.   Skin:  Positive for wound. Negative for rash.   Neurological:  Negative for dizziness, weakness and light-headedness.   Psychiatric/Behavioral:  Negative for agitation, behavioral problems and confusion.    All other systems reviewed and are negative.      Physical Exam  Physical Exam  Vitals and nursing note reviewed.   Constitutional:       Appearance: He is well-developed.   HENT:      Head: Normocephalic and atraumatic.     Eyes:      Extraocular Movements: Extraocular movements intact.   Cardiovascular:      Rate and Rhythm: Normal rate.   Pulmonary:      Effort: Pulmonary effort is normal.   Abdominal:      General: There is no distension.   Musculoskeletal:         General: Normal range of motion.      Cervical back: Normal range of motion.   Skin:     Findings: No rash.   Neurological:      Mental Status: He is  alert and oriented to person, place, and time.   Psychiatric:         Behavior: Behavior normal.         Vital Signs  ED Triage Vitals [07/12/24 1316]   Temperature Pulse Respirations Blood Pressure SpO2   97.8 °F (36.6 °C) 86 18 (!) 182/90 97 %      Temp Source Heart Rate Source Patient Position - Orthostatic VS BP Location FiO2 (%)   Oral Monitor Sitting Left arm --      Pain Score       --           Vitals:    07/12/24 1316 07/12/24 1400 07/12/24 1603   BP: (!) 182/90 (!) 189/79 (!) 175/98   Pulse: 86 67 75   Patient Position - Orthostatic VS: Sitting           Visual Acuity      ED Medications  Medications   lidocaine-epinephrine (XYLOCAINE-MPF/EPINEPHRINE) 2 %-1:200,000 injection 10 mL (10 mL Infiltration Given by Other 7/12/24 1506)       Diagnostic Studies  Results Reviewed       None                   CT head without contrast   Final Result by Michael Meneses MD (07/12 1436)      No acute intracranial abnormality.      Known right parietal meningioma is not as well seen on the current study.      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      Soft tissue swelling of the scalp.                  Workstation performed: [id number]         CT cervical spine without contrast   Final Result by Michael Meneses MD (07/12 1433)      No cervical spine fracture or traumatic malalignment.                  Workstation performed: [id number]                    Procedures  Universal Protocol:  Consent: Verbal consent obtained.  Consent given by: patient  Patient understanding: patient states understanding of the procedure being performed  Laceration repair    Date/Time: 7/12/2024 5:21 PM    Performed by: Willis Morales MD  Authorized by: Willis Morales MD    Associated wounds:   Wound 10/21/23 Head Posterior;SuperiorBody area: head/neck  Location details: scalp  Laceration length: 1 cm  Anesthesia: local infiltration    Anesthesia:  Local  Anesthetic: lidocaine 2% with epinephrine  Anesthetic total: 5 mL    Wound Dehiscence:  Superficial Wound Dehiscence: simple closure      Procedure Details:  Irrigation solution: saline  Irrigation method: jet lavage  Amount of cleaning: extensive  Debridement: none  Skin closure: 3-0 nylon  Number of sutures: 2  Technique: simple  Approximation: close  Approximation difficulty: simple  Patient tolerance: patient tolerated the procedure well with no immediate complications  Comments: Primarily abraded area over the posterior scalp with two small bleeding lacerations each requiring 1 suture, Medial laceration was closed with a figure-of-eight.               ED Course                                 SBIRT 22yo+      Flowsheet Row Most Recent Value   Initial Alcohol Screen: US AUDIT-C     1. How often do you have a drink containing alcohol? 0 Filed at: 07/12/2024 1318   2. How many drinks containing alcohol do you have on a typical day you are drinking?  0 Filed at: 07/12/2024 1318   3a. Male UNDER 65: How often do you have five or more drinks on one occasion? 0 Filed at: 07/12/2024 1318   3b. FEMALE Any Age, or MALE 65+: How often do you have 4 or more drinks on one occassion? 0 Filed at: 07/12/2024 1318   Audit-C Score 0 Filed at: 07/12/2024 1318   SOFYA: How many times in the past year have you...    Used an illegal drug or used a prescription medication for non-medical reasons? Never Filed at: 07/12/2024 1318                      Medical Decision Making  1. Fall - Patient states mechanical fall, offers no complaints. Does have a wound to the posterior scalp, will clean, likely require suture. Will CT head and c-spine to rule out significant injury.     Problems Addressed:  Fall, initial encounter: acute illness or injury  Injury of head, initial encounter: acute illness or injury    Amount and/or Complexity of Data Reviewed  Radiology: ordered.    Risk  Prescription drug management.                  Disposition  Final diagnoses:   Fall, initial encounter   Injury of head, initial encounter     Time reflects when diagnosis was documented in both MDM as applicable and the Disposition within this note       Time User Action Codes Description Comment    7/12/2024  3:33 PM Willis Morales Add [W19.XXXA] Fall, initial encounter     7/12/2024  3:33 PM Willis Morales Add [S09.90XA] Injury of head, initial encounter           ED Disposition       ED Disposition   Discharge    Condition   Stable    Date/Time   Fri Jul 12, 2024 1533    Comment   Seth Nicole discharge to home/self care.                   Follow-up Information       Follow up With Specialties Details Why Contact Info    Hermila Santos MD [address] [address] [phone]              Discharge Medication List as of 7/12/2024  3:34 PM        CONTINUE these medications which have NOT CHANGED    Details   acetaminophen (TYLENOL) 325 mg tablet Take 2 tablets (650 mg total) by mouth every 6 (six) hours as needed for mild pain, headaches or fever, Starting Sat 10/21/2023, Normal      amLODIPine-benazepril (LOTREL 5-20) 5-20 MG per capsule Take 1 capsule by mouth daily, Historical Med      chlorthalidone 25 mg tablet Take 25 mg by mouth daily, Starting Fri 5/5/2023, Until Sat 5/4/2024, Historical Med      glyBURIDE-metFORMIN (GLUCOVANCE) 5-500 MG per tablet Take 1 tablet by mouth 2 (two) times a day with meals, Starting Tue 1/23/2024, Historical Med      levETIRAcetam (KEPPRA) 500 mg tablet Take 1 tablet (500 mg total) by mouth 2 (two) times a day, Starting Sat 10/21/2023, Normal      !! metFORMIN (GLUCOPHAGE) 1000 MG tablet take 1 tablet by mouth once daily with DINNER ALONG WITH GLUCOVANCE, Historical Med      !! metFORMIN (GLUCOPHAGE) 500 mg tablet Take 1 tablet by mouth Twice daily, Historical Med      sildenafil (VIAGRA) 100 mg tablet Take 1 tablet (100 mg  total) by mouth daily as needed for erectile dysfunction, Starting Fri 2/23/2024, Normal      simvastatin (ZOCOR) 40 mg tablet Starting Tue 1/26/2021, Historical Med       !! - Potential duplicate medications found. Please discuss with provider.          No discharge procedures on file.    PDMP Review       None            ED Provider  Electronically Signed by             Willis Morales MD  07/12/24 8701

## 2025-03-06 ENCOUNTER — OFFICE VISIT (OUTPATIENT)
Dept: UROLOGY | Facility: MEDICAL CENTER | Age: OVER 89
End: 2025-03-06
Payer: MEDICARE

## 2025-03-06 VITALS
BODY MASS INDEX: 28.25 KG/M2 | WEIGHT: 180 LBS | DIASTOLIC BLOOD PRESSURE: 74 MMHG | HEIGHT: 67 IN | HEART RATE: 62 BPM | OXYGEN SATURATION: 98 % | SYSTOLIC BLOOD PRESSURE: 176 MMHG

## 2025-03-06 DIAGNOSIS — N40.1 BPH WITH OBSTRUCTION/LOWER URINARY TRACT SYMPTOMS: ICD-10-CM

## 2025-03-06 DIAGNOSIS — N13.8 BPH WITH OBSTRUCTION/LOWER URINARY TRACT SYMPTOMS: ICD-10-CM

## 2025-03-06 DIAGNOSIS — N52.02 CORPORO-VENOUS OCCLUSIVE ERECTILE DYSFUNCTION: Primary | ICD-10-CM

## 2025-03-06 DIAGNOSIS — C69.30 MALIGNANT MELANOMA OF CHOROID, UNSPECIFIED LATERALITY (HCC): ICD-10-CM

## 2025-03-06 DIAGNOSIS — E11.9 TYPE 2 DIABETES MELLITUS WITHOUT COMPLICATION, WITHOUT LONG-TERM CURRENT USE OF INSULIN (HCC): ICD-10-CM

## 2025-03-06 PROCEDURE — 99213 OFFICE O/P EST LOW 20 MIN: CPT

## 2025-03-06 RX ORDER — ATORVASTATIN CALCIUM 20 MG/1
20 TABLET, FILM COATED ORAL DAILY
COMMUNITY
Start: 2025-02-19 | End: 2026-02-19

## 2025-03-06 RX ORDER — TADALAFIL 20 MG/1
20 TABLET ORAL DAILY PRN
Qty: 10 TABLET | Refills: 0 | Status: SHIPPED | OUTPATIENT
Start: 2025-03-06

## 2025-03-06 NOTE — ASSESSMENT & PLAN NOTE
Patient previously utilized MUSE, which is no longer available.  Patient was initiated on Viagra 100 mg as needed 1 hour prior to sexual activity and noticed no significant benefit.  We discussed further pharmacotherapy with trying a separate PDE 5 inhibitor such as Cialis 20 mg.  We discussed that this should be utilized as needed 1 hour prior to sexual activity.  Side effects were discussed in the office today.

## 2025-03-06 NOTE — ASSESSMENT & PLAN NOTE
AUA symptom score 5  Patient currently content with his voiding pattern off of pharmacotherapy.  We will continue to monitor for worsening/progression of lower urinary tract symptoms and the patient will return to the office in 1 year

## 2025-03-06 NOTE — PROGRESS NOTES
3/6/2025      Assessment and Plan    89 y.o. male managed by Dr. Arellano    Corporo-venous occlusive erectile dysfunction  Patient previously utilized MUSE, which is no longer available.  Patient was initiated on Viagra 100 mg as needed 1 hour prior to sexual activity and noticed no significant benefit.  We discussed further pharmacotherapy with trying a separate PDE 5 inhibitor such as Cialis 20 mg.  We discussed that this should be utilized as needed 1 hour prior to sexual activity.  Side effects were discussed in the office today.    BPH with obstruction/lower urinary tract symptoms  AUA symptom score 5  Patient currently content with his voiding pattern off of pharmacotherapy.  We will continue to monitor for worsening/progression of lower urinary tract symptoms and the patient will return to the office in 1 year        History of Present Illness  Seth Nicole is a 89 y.o. male here for evaluation of BPH with obstruction/lower urinary tract symptoms and erectile dysfunction.  Patient was last seen in the office on 2/23/2024.  Patient previously reported symptoms of a slower urinary stream as his most bothersome lower urinary tract symptom.  Patient is not currently controlled on any pharmacotherapy for treatment of his lower urinary tract symptoms.  Patient was initiated on Viagra 100 mg as needed 1 hour prior to sexual activity for treatment of his erectile dysfunction.  Patient previously reported that the nature of his difficulty is achieving an erection and previously utilized MUSE.  Today, the patient reports that he trialed the Viagra 100 mg and noticed no change in his erectile function.  Patient is interested in trying separate pharmacotherapy.  Otherwise, patient offers no other lower urinary tract complaints today's office visit and is satisfied with his voiding pattern at this time.        Review of Systems   Constitutional:  Negative for chills and fever.   HENT:  Negative for ear pain and sore  "throat.    Eyes:  Negative for pain and visual disturbance.   Respiratory:  Negative for cough and shortness of breath.    Cardiovascular:  Negative for chest pain and palpitations.   Gastrointestinal:  Negative for abdominal pain and vomiting.   Genitourinary:  Negative for decreased urine volume, difficulty urinating, dysuria, flank pain, frequency, hematuria and urgency.   Musculoskeletal:  Negative for arthralgias and back pain.   Skin:  Negative for color change and rash.   Neurological:  Negative for seizures and syncope.   All other systems reviewed and are negative.               Vitals  Vitals:    03/06/25 1057   BP: (!) 176/74   BP Location: Left arm   Patient Position: Sitting   Cuff Size: Adult   Pulse: 62   SpO2: 98%   Weight: 81.6 kg (180 lb)   Height: 5' 7\" (1.702 m)       Physical Exam  Vitals reviewed.   Constitutional:       General: He is not in acute distress.     Appearance: Normal appearance. He is not ill-appearing.   HENT:      Head: Normocephalic and atraumatic.      Nose: Nose normal.   Eyes:      General: No scleral icterus.  Pulmonary:      Effort: No respiratory distress.   Abdominal:      General: Abdomen is flat. There is no distension.      Palpations: Abdomen is soft.      Tenderness: There is no abdominal tenderness.   Musculoskeletal:         General: Normal range of motion.      Cervical back: Normal range of motion.   Skin:     General: Skin is warm.      Coloration: Skin is not jaundiced.   Neurological:      Mental Status: He is alert and oriented to person, place, and time.      Gait: Gait normal.   Psychiatric:         Mood and Affect: Mood normal.         Behavior: Behavior normal.           Past History  Past Medical History:   Diagnosis Date    BPH with obstruction/lower urinary tract symptoms     Combined arterial insufficiency and corporo-venous occlusive erectile dysfunction     Diabetes (HCC)      Social History     Socioeconomic History    Marital status:      " Spouse name: None    Number of children: None    Years of education: None    Highest education level: None   Occupational History    None   Tobacco Use    Smoking status: Former    Smokeless tobacco: Never   Vaping Use    Vaping status: Never Used   Substance and Sexual Activity    Alcohol use: No    Drug use: No    Sexual activity: Not Currently   Other Topics Concern    None   Social History Narrative    None     Social Drivers of Health     Financial Resource Strain: Patient Declined (8/1/2023)    [facility]    Overall Financial Resource Strain (CARDIA)     Difficulty of Paying Living Expenses: Patient declined   Food Insecurity: No Food Insecurity (10/21/2023)    Hunger Vital Sign     Worried About Running Out of Food in the Last Year: Never true     Ran Out of Food in the Last Year: Never true   Transportation Needs: No Transportation Needs (10/21/2023)    PRAPARE - Transportation     Lack of Transportation (Medical): No     Lack of Transportation (Non-Medical): No   Physical Activity: Unknown (8/1/2023)    Received from [facility]    Exercise Vital Sign     Days of Exercise per Week: Patient refused     Minutes of Exercise per Session: Patient refused   Stress: Patient Declined (8/1/2023)    [facility], [facility]    Citizen of [place] [employer] - Occupational Stress Questionnaire     Feeling of Stress : Patient declined   Social Connections: Patient Declined (8/1/2023)    [facility] from [facility], [facility]    Social Connection and Isolation Panel [NHANES]     Frequency of Communication with Friends and Family: Patient declined     Frequency of Social Gatherings with Friends and Family: Patient declined     Attends Rastafarian Services: Patient declined     Active Member of Clubs or Organizations: Patient declined     Attends Club or  "Organization Meetings: Patient declined     Marital Status: Patient declined   Intimate Partner Violence: Patient Declined (8/1/2023)    [facility], [facility]    Humiliation, Afraid, Rape, and Kick questionnaire     Fear of Current or Ex-Partner: Patient declined     Emotionally Abused: Patient declined     Physically Abused: Patient declined     Sexually Abused: Patient declined   Housing Stability: Low Risk  (10/21/2023)    Housing Stability Vital Sign     Unable to Pay for Housing in the Last Year: No     Number of Times Moved in the Last Year: 1     Homeless in the Last Year: No     Social History     Tobacco Use   Smoking Status Former   Smokeless Tobacco Never     Family History   Problem Relation Age of Onset    Hypertension Family        The following portions of the patient's history were reviewed and updated as appropriate: allergies, current medications, past medical history, past social history, past surgical history and problem list.    Results  No results found for this or any previous visit (from the past hour).]  No results found for: \"PSA\"  Lab Results   Component Value Date    CALCIUM 9.3 01/23/2024    K 4.6 01/23/2024    CO2 27 01/23/2024     01/23/2024    BUN 31 (H) 01/23/2024    CREATININE 0.95 01/23/2024     Lab Results   Component Value Date    WBC 9.34 10/21/2023    HGB 12.1 10/21/2023    HCT 33.8 (L) 10/21/2023    MCV 87 10/21/2023     10/21/2023      "